# Patient Record
Sex: MALE | Race: WHITE | NOT HISPANIC OR LATINO
[De-identification: names, ages, dates, MRNs, and addresses within clinical notes are randomized per-mention and may not be internally consistent; named-entity substitution may affect disease eponyms.]

---

## 2019-04-08 ENCOUNTER — RESULT CHARGE (OUTPATIENT)
Age: 59
End: 2019-04-08

## 2019-04-09 ENCOUNTER — APPOINTMENT (OUTPATIENT)
Dept: OTHER | Facility: CLINIC | Age: 59
End: 2019-04-09
Payer: COMMERCIAL

## 2019-04-09 VITALS
HEART RATE: 63 BPM | DIASTOLIC BLOOD PRESSURE: 75 MMHG | OXYGEN SATURATION: 98 % | BODY MASS INDEX: 32.78 KG/M2 | WEIGHT: 242 LBS | HEIGHT: 72 IN | RESPIRATION RATE: 14 BRPM | SYSTOLIC BLOOD PRESSURE: 148 MMHG

## 2019-04-09 DIAGNOSIS — Z87.891 PERSONAL HISTORY OF NICOTINE DEPENDENCE: ICD-10-CM

## 2019-04-09 DIAGNOSIS — F17.290 NICOTINE DEPENDENCE, OTHER TOBACCO PRODUCT, UNCOMPLICATED: ICD-10-CM

## 2019-04-09 PROCEDURE — 94060 EVALUATION OF WHEEZING: CPT

## 2019-04-09 PROCEDURE — 99386 PREV VISIT NEW AGE 40-64: CPT | Mod: 25

## 2019-04-09 PROCEDURE — 96150: CPT

## 2019-04-10 LAB
ALBUMIN SERPL ELPH-MCNC: 4.6 G/DL
ALP BLD-CCNC: 66 U/L
ALT SERPL-CCNC: 21 U/L
ANION GAP SERPL CALC-SCNC: 11 MMOL/L
APPEARANCE: CLEAR
AST SERPL-CCNC: 12 U/L
BACTERIA: NEGATIVE
BASOPHILS # BLD AUTO: 0.06 K/UL
BASOPHILS NFR BLD AUTO: 0.7 %
BILIRUB SERPL-MCNC: 0.3 MG/DL
BILIRUBIN URINE: NEGATIVE
BLOOD URINE: NORMAL
BUN SERPL-MCNC: 12 MG/DL
CALCIUM SERPL-MCNC: 9.1 MG/DL
CHLORIDE SERPL-SCNC: 103 MMOL/L
CHOLEST SERPL-MCNC: 209 MG/DL
CHOLEST/HDLC SERPL: 6.7 RATIO
CO2 SERPL-SCNC: 26 MMOL/L
COLOR: NORMAL
CREAT SERPL-MCNC: 0.95 MG/DL
EOSINOPHIL # BLD AUTO: 0.27 K/UL
EOSINOPHIL NFR BLD AUTO: 3.3 %
GLUCOSE QUALITATIVE U: NEGATIVE
GLUCOSE SERPL-MCNC: 93 MG/DL
HCT VFR BLD CALC: 43.9 %
HDLC SERPL-MCNC: 31 MG/DL
HGB BLD-MCNC: 14 G/DL
HYALINE CASTS: 0 /LPF
IMM GRANULOCYTES NFR BLD AUTO: 0.4 %
KETONES URINE: NEGATIVE
LDLC SERPL CALC-MCNC: 131 MG/DL
LEUKOCYTE ESTERASE URINE: NEGATIVE
LYMPHOCYTES # BLD AUTO: 3.08 K/UL
LYMPHOCYTES NFR BLD AUTO: 37.7 %
MAN DIFF?: NORMAL
MCHC RBC-ENTMCNC: 29.1 PG
MCHC RBC-ENTMCNC: 31.9 GM/DL
MCV RBC AUTO: 91.3 FL
MICROSCOPIC-UA: NORMAL
MONOCYTES # BLD AUTO: 0.65 K/UL
MONOCYTES NFR BLD AUTO: 7.9 %
NEUTROPHILS # BLD AUTO: 4.09 K/UL
NEUTROPHILS NFR BLD AUTO: 50 %
NITRITE URINE: NEGATIVE
PH URINE: 6
PLATELET # BLD AUTO: 240 K/UL
POTASSIUM SERPL-SCNC: 4.4 MMOL/L
PROT SERPL-MCNC: 6.8 G/DL
PROTEIN URINE: NEGATIVE
RBC # BLD: 4.81 M/UL
RBC # FLD: 12.8 %
RED BLOOD CELLS URINE: 4 /HPF
SODIUM SERPL-SCNC: 140 MMOL/L
SPECIFIC GRAVITY URINE: 1.02
SQUAMOUS EPITHELIAL CELLS: 1 /HPF
TRIGL SERPL-MCNC: 235 MG/DL
UROBILINOGEN URINE: NORMAL
WBC # FLD AUTO: 8.18 K/UL
WHITE BLOOD CELLS URINE: 0 /HPF

## 2019-04-10 NOTE — DISCUSSION/SUMMARY
[Patient seen for WTC Monitoring ___] : Patient was seen for WTC monitoring [unfilled] [Please See Note in Chart and Documentation in Trial DB] : Please see note in chart and documentation in Trial DB. [FreeTextEntry3] : [facility] Monitoring Evaluation\par \par  ID#: [id number] 	               \par  Visit: 1	                        \par  Date: 4/9/19 \par \par HPI: 58 yr old white male presents to [facility] for 1st annual monitoring visit. He is presently treated for HTN, HLD and high triglycerides. He has a right sided moveable neck mass and had an US which revealed nodules. He never f/u and went to endocrinologist. He c/o chronic cough with wheezing at times. He is a former cigarette smoker and just smokes occasional cigar now. He sometimes gets chest tightness and JIMENEZ+. He has chest burning and acid reflux since January of 2004 and takes omeprazole on occasion. He snores and has witnessed apneas. He wakes up choking. He has chronic sinus congestion since June of 2005 but doesn’t take any meds at this time. He has chronic fatigue and tinnitus. 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 HE was exposed to significant amounts of dust and debris.\par Occupational Hx: [employer] \par PMH:See Allscripts and Trial DB\par PSH: See Allscripts and Trial DB \par Family History: See Allscripts and Trial DB \par Preventive Screening: \par Colonoscopy-never had                           \par CXR-1/1/19\par Prostate exam-never had\par Allergies: See Allscripts and Trial DB \par Meds: See Trial DB and Allscripts \par Social  Hx: See Allscripts and Trial DB\par Smoking Status: See Allscripts and Trial DB \par Review of Systems-IAMQ reviewed with patient\par \par PHYSICAL EXAM: See Trial DB.  \par \par Spirometry: Reviewed. Low VC\par \par Assessment:\par HTN \par HLD\par High triglycerides\par HX wheezing\par Fatigue\par JIMENEZ+\par GERD/chest burning\par Snoring/witnessed apneas\par Overweight\par Tinnitus\par Right sided neck mass-recent thyroid US showed nodules per pt-he never f/u with endocrinologist\par \par Plan: \par CBC, CMP, lipids, UA, spirometry ordered\par Refusing colonoscopy at this time\par Referred to pulmonary for assessment of wheezing and snoring\par Referred to Endocrine for further evaluation of neck mass/thyroid nodules\par F/U with PCP for elevated blood pressure,fatigue, GERD, sinusitis\par Will submit paperwork for certification of sinusitis and GERD\par F/U at [facility] post certification\par \par \par   \par \par \par \par \par \par \par \par \par

## 2019-05-21 ENCOUNTER — APPOINTMENT (OUTPATIENT)
Dept: PULMONOLOGY | Facility: CLINIC | Age: 59
End: 2019-05-21
Payer: COMMERCIAL

## 2019-05-21 VITALS
DIASTOLIC BLOOD PRESSURE: 91 MMHG | SYSTOLIC BLOOD PRESSURE: 134 MMHG | BODY MASS INDEX: 31.42 KG/M2 | HEIGHT: 72 IN | HEART RATE: 78 BPM | OXYGEN SATURATION: 98 % | TEMPERATURE: 98 F | WEIGHT: 232 LBS

## 2019-05-21 PROCEDURE — 94729 DIFFUSING CAPACITY: CPT

## 2019-05-21 PROCEDURE — 99203 OFFICE O/P NEW LOW 30 MIN: CPT | Mod: 25

## 2019-05-21 PROCEDURE — 94726 PLETHYSMOGRAPHY LUNG VOLUMES: CPT

## 2019-05-21 PROCEDURE — 94010 BREATHING CAPACITY TEST: CPT

## 2019-05-21 PROCEDURE — ZZZZZ: CPT

## 2019-05-22 NOTE — REVIEW OF SYSTEMS
[Dyspnea] : dyspnea [Wheezing] : wheezing [Snoring] : snoring [Witnessed Apneas] : demonstrated no ~M apnea [Nonrestorative Sleep] : nonrestorative sleep [Negative] : Endocrine

## 2019-05-22 NOTE — PROCEDURE
[FreeTextEntry1] : Spirometry shows no obstruction. Lung volumes show mild restriction. DLCO is normal.

## 2019-05-22 NOTE — REASON FOR VISIT
[Initial Evaluation] : an initial evaluation [FreeTextEntry1] : [facility] for wheezing and snoring

## 2019-05-22 NOTE — PHYSICAL EXAM
[General Appearance - Well Developed] : well developed [Normal Appearance] : normal appearance [Well Groomed] : well groomed [General Appearance - Well Nourished] : well nourished [No Deformities] : no deformities [General Appearance - In No Acute Distress] : no acute distress [Normal Conjunctiva] : the conjunctiva exhibited no abnormalities [Normal Oropharynx] : normal oropharynx [III] : III [Neck Appearance] : the appearance of the neck was normal [FreeTextEntry1] : Enlarged thyroid [Heart Rate And Rhythm] : heart rate and rhythm were normal [Heart Sounds] : normal S1 and S2 [Murmurs] : no murmurs present [Arterial Pulses Normal] : the arterial pulses were normal [Edema] : no peripheral edema present [Respiration, Rhythm And Depth] : normal respiratory rhythm and effort [Exaggerated Use Of Accessory Muscles For Inspiration] : no accessory muscle use [Auscultation Breath Sounds / Voice Sounds] : lungs were clear to auscultation bilaterally [Bowel Sounds] : normal bowel sounds [Abdomen Soft] : soft [Abdomen Tenderness] : non-tender [] : no hepato-splenomegaly [Abdomen Mass (___ Cm)] : no abdominal mass palpated [Abnormal Walk] : normal gait [Nail Clubbing] : no clubbing of the fingernails [Cyanosis, Localized] : no localized cyanosis [Skin Color & Pigmentation] : normal skin color and pigmentation [Skin Turgor] : normal skin turgor [No Focal Deficits] : no focal deficits [Oriented To Time, Place, And Person] : oriented to person, place, and time [Impaired Insight] : insight and judgment were intact

## 2019-05-28 ENCOUNTER — APPOINTMENT (OUTPATIENT)
Dept: ENDOCRINOLOGY | Facility: CLINIC | Age: 59
End: 2019-05-28
Payer: COMMERCIAL

## 2019-05-28 VITALS
HEIGHT: 72 IN | TEMPERATURE: 98.2 F | BODY MASS INDEX: 31.42 KG/M2 | OXYGEN SATURATION: 98 % | HEART RATE: 65 BPM | DIASTOLIC BLOOD PRESSURE: 78 MMHG | SYSTOLIC BLOOD PRESSURE: 120 MMHG | WEIGHT: 232 LBS

## 2019-05-28 PROCEDURE — 36415 COLL VENOUS BLD VENIPUNCTURE: CPT

## 2019-05-28 PROCEDURE — 99204 OFFICE O/P NEW MOD 45 MIN: CPT | Mod: 25

## 2019-07-07 NOTE — PHYSICAL EXAM
[Alert] : alert [No Acute Distress] : no acute distress [Well Nourished] : well nourished [Well Developed] : well developed [Normal Sclera/Conjunctiva] : normal sclera/conjunctiva [EOMI] : extra ocular movement intact [No Proptosis] : no proptosis [Normal Oropharynx] : the oropharynx was normal [No Thyroid Nodules] : there were no palpable thyroid nodules [No Respiratory Distress] : no respiratory distress [No Accessory Muscle Use] : no accessory muscle use [Clear to Auscultation] : lungs were clear to auscultation bilaterally [Normal Rate] : heart rate was normal  [Normal S1, S2] : normal S1 and S2 [Regular Rhythm] : with a regular rhythm [Pedal Pulses Normal] : the pedal pulses are present [No Edema] : there was no peripheral edema [Normal Bowel Sounds] : normal bowel sounds [Not Tender] : non-tender [Soft] : abdomen soft [Not Distended] : not distended [Post Cervical Nodes] : posterior cervical nodes [Anterior Cervical Nodes] : anterior cervical nodes [Axillary Nodes] : axillary nodes [Normal] : normal and non tender [No Spinal Tenderness] : no spinal tenderness [Spine Straight] : spine straight [No Stigmata of Cushings Syndrome] : no stigmata of cushings syndrome [Normal Gait] : normal gait [Normal Strength/Tone] : muscle strength and tone were normal [No Rash] : no rash [Normal Reflexes] : deep tendon reflexes were 2+ and symmetric [No Tremors] : no tremors [Oriented x3] : oriented to person, place, and time [Acanthosis Nigricans] : no acanthosis nigricans [de-identified] : Rl lower/lower isthmic  region nodule about 2 cm. No palp adenopathy

## 2019-07-07 NOTE — HISTORY OF PRESENT ILLNESS
[FreeTextEntry1] : Mr. EUCEDA  is a 58 year  year old male  who presents for initial endocrine evaluation. He presents with regard to a history of goiter with recent thyroid US showing a 2.4 cm isthmic nodule along with other subcm nodules .\par Additional medical history includes that of htn, hyperlipidemia and gerd. He too notes that he was [place]   [place] a [place] . he too notes hx of hyperlipidemia.\par FH  No known thyroid hx.\par Very tired chronically.\par Trying to lose weight  lost about 10 lbs with 8-16 diet.\par Denies anterior neck discomfort, difficulty swallowing, or any change in the appearance of the neck region.\par

## 2019-07-08 ENCOUNTER — CLINICAL ADVICE (OUTPATIENT)
Age: 59
End: 2019-07-08

## 2019-07-09 LAB
25(OH)D3 SERPL-MCNC: 19.4 NG/ML
BASOPHILS # BLD AUTO: 0.04 K/UL
BASOPHILS NFR BLD AUTO: 0.6 %
COLLAGEN CTX SERPL-MCNC: 419 PG/ML
EOSINOPHIL # BLD AUTO: 0.19 K/UL
EOSINOPHIL NFR BLD AUTO: 3 %
ESTIMATED AVERAGE GLUCOSE: 111 MG/DL
HBA1C MFR BLD HPLC: 5.5 %
HCT VFR BLD CALC: 41.6 %
HGB BLD-MCNC: 13.7 G/DL
IMM GRANULOCYTES NFR BLD AUTO: 0.2 %
LYMPHOCYTES # BLD AUTO: 2.58 K/UL
LYMPHOCYTES NFR BLD AUTO: 40.7 %
MAN DIFF?: NORMAL
MCHC RBC-ENTMCNC: 29.3 PG
MCHC RBC-ENTMCNC: 32.9 GM/DL
MCV RBC AUTO: 89.1 FL
MONOCYTES # BLD AUTO: 0.6 K/UL
MONOCYTES NFR BLD AUTO: 9.5 %
NEUTROPHILS # BLD AUTO: 2.92 K/UL
NEUTROPHILS NFR BLD AUTO: 46 %
PLATELET # BLD AUTO: 232 K/UL
RBC # BLD: 4.67 M/UL
RBC # FLD: 12.7 %
T3FREE SERPL-MCNC: 2.8 PG/ML
T4 FREE SERPL-MCNC: 1.2 NG/DL
THYROGLOB AB SERPL-ACNC: <20 IU/ML
THYROPEROXIDASE AB SERPL IA-ACNC: 409 IU/ML
TSH SERPL-ACNC: 0.94 UIU/ML
VIT B12 SERPL-MCNC: 492 PG/ML
WBC # FLD AUTO: 6.34 K/UL

## 2019-07-10 ENCOUNTER — FORM ENCOUNTER (OUTPATIENT)
Age: 59
End: 2019-07-10

## 2019-07-24 ENCOUNTER — APPOINTMENT (OUTPATIENT)
Dept: ENDOCRINOLOGY | Facility: CLINIC | Age: 59
End: 2019-07-24
Payer: COMMERCIAL

## 2019-07-24 ENCOUNTER — RX RENEWAL (OUTPATIENT)
Age: 59
End: 2019-07-24

## 2019-07-24 VITALS
BODY MASS INDEX: 29.26 KG/M2 | HEART RATE: 66 BPM | HEIGHT: 72 IN | OXYGEN SATURATION: 98 % | TEMPERATURE: 98.4 F | DIASTOLIC BLOOD PRESSURE: 78 MMHG | SYSTOLIC BLOOD PRESSURE: 120 MMHG | WEIGHT: 216 LBS

## 2019-07-24 DIAGNOSIS — E66.3 OVERWEIGHT: ICD-10-CM

## 2019-07-24 DIAGNOSIS — R63.4 ABNORMAL WEIGHT LOSS: ICD-10-CM

## 2019-07-24 DIAGNOSIS — R53.82 CHRONIC FATIGUE, UNSPECIFIED: ICD-10-CM

## 2019-07-24 PROCEDURE — 99214 OFFICE O/P EST MOD 30 MIN: CPT

## 2019-07-24 RX ORDER — LOVASTATIN 20 MG/1
20 TABLET ORAL
Refills: 0 | Status: DISCONTINUED | COMMUNITY
End: 2019-07-24

## 2019-07-24 RX ORDER — ERGOCALCIFEROL 1.25 MG/1
1.25 MG CAPSULE ORAL
Qty: 13 | Refills: 3 | Status: ACTIVE | COMMUNITY
Start: 2019-07-24 | End: 1900-01-01

## 2019-08-14 ENCOUNTER — APPOINTMENT (OUTPATIENT)
Dept: SURGERY | Facility: CLINIC | Age: 59
End: 2019-08-14
Payer: COMMERCIAL

## 2019-08-14 DIAGNOSIS — Z86.39 PERSONAL HISTORY OF OTHER ENDOCRINE, NUTRITIONAL AND METABOLIC DISEASE: ICD-10-CM

## 2019-08-14 DIAGNOSIS — Z86.79 PERSONAL HISTORY OF OTHER DISEASES OF THE CIRCULATORY SYSTEM: ICD-10-CM

## 2019-08-14 PROCEDURE — 99204 OFFICE O/P NEW MOD 45 MIN: CPT

## 2019-08-18 PROBLEM — Z86.39 HISTORY OF HIGH CHOLESTEROL: Status: RESOLVED | Noted: 2019-08-18 | Resolved: 2019-08-18

## 2019-08-18 PROBLEM — Z86.79 HISTORY OF HYPERTENSION: Status: RESOLVED | Noted: 2019-08-18 | Resolved: 2019-08-18

## 2019-08-21 PROBLEM — R53.82 CHRONIC FATIGUE: Status: ACTIVE | Noted: 2019-05-28

## 2019-08-21 PROBLEM — R63.4 WEIGHT LOSS: Status: ACTIVE | Noted: 2019-07-07

## 2019-08-21 PROBLEM — E66.3 OVER WEIGHT: Status: ACTIVE | Noted: 2019-08-21

## 2019-08-21 NOTE — REASON FOR VISIT
[Initial Consultation] : an initial consultation for [Other: _____] : [unfilled] [FreeTextEntry2] : suspicious left thyroid nodule

## 2019-08-21 NOTE — CONSULT LETTER
[Dear  ___] : Dear  [unfilled], [Consult Letter:] : I had the pleasure of evaluating your patient, [unfilled]. [Please see my note below.] : Please see my note below. [Consult Closing:] : Thank you very much for allowing me to participate in the care of this patient.  If you have any questions, please do not hesitate to contact me. [FreeTextEntry2] : Dr. Alberto Gomes [FreeTextEntry3] : Sincerely yours,\par \par Isi Del Rosario MD, FACS\par Assistant Professor of Surgery\par [facility]

## 2019-08-21 NOTE — PHYSICAL EXAM
[Normal] : orientation to person, place, and time: normal [de-identified] : no cervical or supraclavicular adenopathy, trachea midline, thyroid isthmus nodule 2.5 cm , left lobe full without  palpable discreet mass

## 2019-08-21 NOTE — HISTORY OF PRESENT ILLNESS
[FreeTextEntry1] : Mr. EUCEDA  is a 59 year year old male who returns today for endocrine reevaluation.  Patient returns with regard to  a history of nodular thyroid .  He did recently undergo US guided FNA of a 2.1 x 1.8 left interpolar thyroid nodule.  The initial results were c/w atypia of undetermined significance-[place] [facility].  Subsequent Thyroseq testing demonstrated A kras mutation with malig risk said o be at 40%.  this was discussed with patient in great detail today.\par Does follow with [facility] health program through [facility] in [facility]   [phone]   \par Denies anterior neck discomfort, difficulty swallowing, or any change in the appearance of the neck region.\par \par

## 2019-08-21 NOTE — HISTORY OF PRESENT ILLNESS
[de-identified] : Patient referred by Dr. Gomes for evaluation of suspicious left thyroid nodule. In 2003 patient noted neck swelling, diagnosed with thyroid nodules. Denies dysphagia, change in voice. Patient being monitored by [facility]. Thyroid ultrasound April 2017: Right lobe 4.4 x 1.5 x 2.3 CM with subcentimeter nodules. The left lobe 6.3 x 3.1 x 2.3 CM with multiple nodules largest 2.3 CM. Isthmus complex mass 2.4 x 1.4 x 2.1 CM. Biopsies June 2019: Isthmus nodule 2.5 cm benign, left mid nodule 2.1 cm with course calcifications   AUS, KRAS dictation Thyroseq, intermediate risk 40%.

## 2019-08-21 NOTE — PHYSICAL EXAM
[Alert] : alert [No Acute Distress] : no acute distress [Well Developed] : well developed [Well Nourished] : well nourished [EOMI] : extra ocular movement intact [Normal Sclera/Conjunctiva] : normal sclera/conjunctiva [No Proptosis] : no proptosis [Thyroid Not Enlarged] : the thyroid was not enlarged [Normal Oropharynx] : the oropharynx was normal [No Thyroid Nodules] : there were no palpable thyroid nodules [No Respiratory Distress] : no respiratory distress [No Accessory Muscle Use] : no accessory muscle use [Clear to Auscultation] : lungs were clear to auscultation bilaterally [Normal Rate] : heart rate was normal  [Normal S1, S2] : normal S1 and S2 [Pedal Pulses Normal] : the pedal pulses are present [Regular Rhythm] : with a regular rhythm [No Edema] : there was no peripheral edema [Normal Bowel Sounds] : normal bowel sounds [Not Tender] : non-tender [Soft] : abdomen soft [Post Cervical Nodes] : posterior cervical nodes [Not Distended] : not distended [Anterior Cervical Nodes] : anterior cervical nodes [Axillary Nodes] : axillary nodes [Normal] : normal and non tender [No Spinal Tenderness] : no spinal tenderness [Spine Straight] : spine straight [Normal Gait] : normal gait [No Stigmata of Cushings Syndrome] : no stigmata of cushings syndrome [Normal Strength/Tone] : muscle strength and tone were normal [No Rash] : no rash [Acanthosis Nigricans] : no acanthosis nigricans [Normal Reflexes] : deep tendon reflexes were 2+ and symmetric [No Tremors] : no tremors [Oriented x3] : oriented to person, place, and time

## 2019-08-21 NOTE — ASSESSMENT
[FreeTextEntry1] : Patient with suspicious left thyroid nodule and increased risk due to exposure for malignancy. I have recommended a left thyroid lobectomy and isthmusectomy for definitive diagnosis and treatment. The patient is reluctant to undergo surgery. He understands the potential risk of undiagnosed malignancy. I have discussed the risks, benefits and alternative treatments which include but are not limited to bleeding, infection, numbness, hoarseness, hypocalcemia, scarring, and need for reoperation. I have answered the patient's questions.  I have requested a followup ultrasound January 2020, RTO6 months. If nodule enlarges, surgery will be scheduled at that time.

## 2019-08-22 ENCOUNTER — FORM ENCOUNTER (OUTPATIENT)
Age: 59
End: 2019-08-22

## 2019-12-16 ENCOUNTER — EMERGENCY (EMERGENCY)
Facility: HOSPITAL | Age: 59
LOS: 1 days | Discharge: HOME | End: 2019-12-16
Attending: EMERGENCY MEDICINE
Payer: COMMERCIAL

## 2019-12-16 VITALS
RESPIRATION RATE: 18 BRPM | HEART RATE: 78 BPM | OXYGEN SATURATION: 99 % | SYSTOLIC BLOOD PRESSURE: 166 MMHG | WEIGHT: 210.1 LBS | DIASTOLIC BLOOD PRESSURE: 98 MMHG | TEMPERATURE: 98 F

## 2019-12-16 VITALS
DIASTOLIC BLOOD PRESSURE: 90 MMHG | HEART RATE: 60 BPM | OXYGEN SATURATION: 98 % | SYSTOLIC BLOOD PRESSURE: 155 MMHG | RESPIRATION RATE: 19 BRPM

## 2019-12-16 LAB
ALBUMIN SERPL ELPH-MCNC: 4.3 G/DL — SIGNIFICANT CHANGE UP (ref 3.5–5)
ALP SERPL-CCNC: 66 U/L — SIGNIFICANT CHANGE UP (ref 40–120)
ALT FLD-CCNC: 23 U/L DA — SIGNIFICANT CHANGE UP (ref 10–60)
ANION GAP SERPL CALC-SCNC: 3 MMOL/L — LOW (ref 5–17)
APTT BLD: 32.8 SEC — SIGNIFICANT CHANGE UP (ref 27.5–36.3)
AST SERPL-CCNC: 9 U/L — LOW (ref 10–40)
BILIRUB SERPL-MCNC: 0.3 MG/DL — SIGNIFICANT CHANGE UP (ref 0.2–1.2)
BUN SERPL-MCNC: 16 MG/DL — SIGNIFICANT CHANGE UP (ref 7–18)
CALCIUM SERPL-MCNC: 8.9 MG/DL — SIGNIFICANT CHANGE UP (ref 8.4–10.5)
CHLORIDE SERPL-SCNC: 107 MMOL/L — SIGNIFICANT CHANGE UP (ref 96–108)
CO2 SERPL-SCNC: 31 MMOL/L — SIGNIFICANT CHANGE UP (ref 22–31)
CREAT SERPL-MCNC: 0.84 MG/DL — SIGNIFICANT CHANGE UP (ref 0.5–1.3)
GLUCOSE SERPL-MCNC: 92 MG/DL — SIGNIFICANT CHANGE UP (ref 70–99)
HCT VFR BLD CALC: 44.8 % — SIGNIFICANT CHANGE UP (ref 39–50)
HGB BLD-MCNC: 14.8 G/DL — SIGNIFICANT CHANGE UP (ref 13–17)
INR BLD: 0.98 RATIO — SIGNIFICANT CHANGE UP (ref 0.88–1.16)
MCHC RBC-ENTMCNC: 29 PG — SIGNIFICANT CHANGE UP (ref 27–34)
MCHC RBC-ENTMCNC: 33 GM/DL — SIGNIFICANT CHANGE UP (ref 32–36)
MCV RBC AUTO: 87.8 FL — SIGNIFICANT CHANGE UP (ref 80–100)
NRBC # BLD: 0 /100 WBCS — SIGNIFICANT CHANGE UP (ref 0–0)
NT-PROBNP SERPL-SCNC: 27 PG/ML — SIGNIFICANT CHANGE UP (ref 0–125)
PLATELET # BLD AUTO: 226 K/UL — SIGNIFICANT CHANGE UP (ref 150–400)
POTASSIUM SERPL-MCNC: 4.2 MMOL/L — SIGNIFICANT CHANGE UP (ref 3.5–5.3)
POTASSIUM SERPL-SCNC: 4.2 MMOL/L — SIGNIFICANT CHANGE UP (ref 3.5–5.3)
PROT SERPL-MCNC: 7.6 G/DL — SIGNIFICANT CHANGE UP (ref 6–8.3)
PROTHROM AB SERPL-ACNC: 10.9 SEC — SIGNIFICANT CHANGE UP (ref 10–12.9)
RBC # BLD: 5.1 M/UL — SIGNIFICANT CHANGE UP (ref 4.2–5.8)
RBC # FLD: 12.9 % — SIGNIFICANT CHANGE UP (ref 10.3–14.5)
SODIUM SERPL-SCNC: 141 MMOL/L — SIGNIFICANT CHANGE UP (ref 135–145)
TROPONIN I SERPL-MCNC: <0.015 NG/ML — SIGNIFICANT CHANGE UP (ref 0–0.04)
WBC # BLD: 10.26 K/UL — SIGNIFICANT CHANGE UP (ref 3.8–10.5)
WBC # FLD AUTO: 10.26 K/UL — SIGNIFICANT CHANGE UP (ref 3.8–10.5)

## 2019-12-16 PROCEDURE — 36415 COLL VENOUS BLD VENIPUNCTURE: CPT

## 2019-12-16 PROCEDURE — 85610 PROTHROMBIN TIME: CPT

## 2019-12-16 PROCEDURE — 71045 X-RAY EXAM CHEST 1 VIEW: CPT | Mod: 26

## 2019-12-16 PROCEDURE — 84484 ASSAY OF TROPONIN QUANT: CPT

## 2019-12-16 PROCEDURE — 80053 COMPREHEN METABOLIC PANEL: CPT

## 2019-12-16 PROCEDURE — 85027 COMPLETE CBC AUTOMATED: CPT

## 2019-12-16 PROCEDURE — 85730 THROMBOPLASTIN TIME PARTIAL: CPT

## 2019-12-16 PROCEDURE — 93005 ELECTROCARDIOGRAM TRACING: CPT

## 2019-12-16 PROCEDURE — 99284 EMERGENCY DEPT VISIT MOD MDM: CPT | Mod: 25

## 2019-12-16 PROCEDURE — 71045 X-RAY EXAM CHEST 1 VIEW: CPT

## 2019-12-16 PROCEDURE — 99285 EMERGENCY DEPT VISIT HI MDM: CPT

## 2019-12-16 PROCEDURE — 83880 ASSAY OF NATRIURETIC PEPTIDE: CPT

## 2019-12-16 RX ORDER — AMLODIPINE BESYLATE 2.5 MG/1
10 TABLET ORAL ONCE
Refills: 0 | Status: COMPLETED | OUTPATIENT
Start: 2019-12-16 | End: 2019-12-16

## 2019-12-16 RX ORDER — AMLODIPINE BESYLATE 2.5 MG/1
1 TABLET ORAL
Qty: 30 | Refills: 0
Start: 2019-12-16 | End: 2020-01-14

## 2019-12-16 RX ADMIN — AMLODIPINE BESYLATE 10 MILLIGRAM(S): 2.5 TABLET ORAL at 05:38

## 2019-12-16 NOTE — ED ADULT NURSE NOTE - OBJECTIVE STATEMENT
pt aaox4 with complaint of dizziness since yesterday, as per pt stopped taking blood pressure medications x 4 months,,denies chest,denies shortness of breath

## 2019-12-16 NOTE — ED PROVIDER NOTE - PATIENT PORTAL LINK FT
You can access the FollowMyHealth Patient Portal offered by [facility] by registering at the following website: [email or web address]. By joining [email or web address]’s FollowMyHealth portal, you will also be able to view your health information using other applications (apps) compatible with our system.

## 2019-12-16 NOTE — ED PROVIDER NOTE - CLINICAL SUMMARY MEDICAL DECISION MAKING FREE TEXT BOX
60yo M with hx HTN/HLD noncompliant with medications presents with dizziness.   EKG nonischemic, trop neg, Cr wnl  patient declines CT head, given amlodipine 10mg, will reeval BP.

## 2019-12-16 NOTE — ED PROVIDER NOTE - NORMAL, MLM
Onset: just now    Location / description: Awoke this am with dizziness and nausea.  +room spinning. States has had vertigo in the past and this feels similar.  No vomiting.  Also took first dose of Gabapentin last night and is unsure if that is what's causing the sx.  Able to walk without difficulty.  Advised to FU within 24 hours. Verb understanding care advice and to call back with further questions/concerns.      Pain Scale (1 - 10), 10 highest: 1/10 HA    What improves/worsens symptoms: nothing    Symptom specific medications: Gabapentin    LMP : No LMP recorded. Patient is not currently having periods (Reason: Intrauterine Device).  Are you pregnant or breast feeding: na    Recent Care: 1/29/18      Reason for Disposition  • [1] MODERATE dizziness (e.g., feels very unsteady, interferes with normal activities) AND [2] has NOT been evaluated by physician for this    Protocols used: DIZZINESS - VERTIGO-A-AH       singh all pertinent systems normal

## 2019-12-16 NOTE — ED PROVIDER NOTE - OBJECTIVE STATEMENT
60yo M with hx HTN/HLD noncompliant with medications presents with dizziness. Reports that for the past week she has been having intermittent lightheadedness. Reports that earlier today /100 he became concerned and decided to come to ED. Reports he took losartan 40mg at 12am. Reports mild headache currently. Also reports intemittent chest pain which he attributes to anxiety. Reports he stopped taking his meds for HTN 6 months ago after he lost weight and noted his BP was better controlled. Denies visual changes or focal weakness or numbness.

## 2020-01-29 ENCOUNTER — FORM ENCOUNTER (OUTPATIENT)
Age: 60
End: 2020-01-29

## 2020-02-12 ENCOUNTER — APPOINTMENT (OUTPATIENT)
Dept: SURGERY | Facility: CLINIC | Age: 60
End: 2020-02-12

## 2020-04-20 ENCOUNTER — APPOINTMENT (OUTPATIENT)
Dept: OTHER | Facility: CLINIC | Age: 60
End: 2020-04-20
Payer: COMMERCIAL

## 2020-04-20 PROCEDURE — 99396 PREV VISIT EST AGE 40-64: CPT

## 2020-04-20 NOTE — DISCUSSION/SUMMARY
[Patient seen for WTC Monitoring ___] : Patient was seen for WTC monitoring [unfilled] [Please See Note in Chart and Documentation in Trial DB] : Please see note in chart and documentation in Trial DB. [FreeTextEntry3] : HPI: 59 yr old white male presents to [facility] for annual monitoring visit. \par this encounter was conducted over  on the telephone during time of COVID 19 pandemic\par \par  In 2003 patient noted neck swelling, diagnosed with thyroid nodules. Denies dysphagia, change in voice. Patient being monitored by [employer]. Thyroid ultrasound April 2017: Right lobe 4.4 x 1.5 x 2.3 CM with subcentimeter nodules. The left lobe 6.3 x 3.1 x 2.3 CM with multiple nodules largest 2.3 CM. Isthmus complex mass 2.4 x 1.4 x 2.1 CM. Biopsies June 2019: Isthmus nodule 2.5 cm benign, left mid nodule 2.1 cm with course calcifications AUS, KRAS dictation Thyroseq, intermediate risk 40%. \par  \par Patient with suspicious left thyroid nodule and increased risk due to exposure for malignancy. HE WAS recommended TO HAVE a left thyroid lobectomy and isthmusectomy for definitive diagnosis and treatment. The patient WAS reluctant to undergo surgery.  followup ultrasound January 2020 WAS RECOMMENDED BUT PATIENT DID NOT FOLLOW UP. If nodule enlarges, surgery will be scheduled at that time. 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 HE was exposed to significant amounts of dust and debris.\par Occupational Hx: [employer] \par PMH:HTN, HLD and high triglycerides.\par PSH: See Allscripts and Trial DB \par Family History: See Allscripts and Trial DB \par Preventive Screening: \par Colonoscopy- never had \par CXR-1/1/19\par \par Allergies:  nkda \par Meds: REVIEWED IN   Trial DB and Allscripts \par Social Hx: See Allscripts and Trial DB\par Smoking Status: former smoker \par Review of Systems-IAMQ reviewed with patient\par \par PHYSICAL EXAM: to be performed at a later date during face to face encounter to complete Health system monitoring visit\par \par \par Spirometry: to be performed at a later date during face to face encounter to complete Health system monitoring visit\par \par \par Assessment and plan  :\par \par CBC, CMP, lipids, UA, to be performed at a later date during face to face encounter to complete WTC monitoring visit\par \par referral to US thyroid and head and neck surgery for eval of enlarging thyroid nodule \par referral to screening colonoscopy

## 2020-05-08 ENCOUNTER — TRANSCRIPTION ENCOUNTER (OUTPATIENT)
Age: 60
End: 2020-05-08

## 2020-12-03 ENCOUNTER — APPOINTMENT (OUTPATIENT)
Dept: OTHER | Facility: CLINIC | Age: 60
End: 2020-12-03
Payer: COMMERCIAL

## 2020-12-03 VITALS
HEART RATE: 67 BPM | OXYGEN SATURATION: 98 % | DIASTOLIC BLOOD PRESSURE: 89 MMHG | RESPIRATION RATE: 16 BRPM | SYSTOLIC BLOOD PRESSURE: 146 MMHG | WEIGHT: 215 LBS | TEMPERATURE: 97.5 F | HEIGHT: 72 IN | BODY MASS INDEX: 29.12 KG/M2

## 2020-12-03 DIAGNOSIS — Z23 ENCOUNTER FOR IMMUNIZATION: ICD-10-CM

## 2020-12-03 PROCEDURE — 99214 OFFICE O/P EST MOD 30 MIN: CPT

## 2020-12-03 RX ORDER — OMEPRAZOLE 20 MG/1
TABLET, DELAYED RELEASE ORAL
Refills: 0 | Status: DISCONTINUED | COMMUNITY
End: 2020-12-03

## 2020-12-03 NOTE — ASSESSMENT
[FreeTextEntry1] : \par again i had recommended referral to US thyroid and head and neck surgery for eval of suspicious  enlarging thyroid nodule \par  patient agreed \par \par i recommended screening colonoscopy referral for colon cancer screening  , he declined \par  scalp  lesions - refer to Derm to r/o non melanoma skin cancer \par patient was offered but  declined influenza vaccination\par GERD- cont with Nexium\par diet and weight loss recommended

## 2020-12-03 NOTE — PHYSICAL EXAM

## 2020-12-03 NOTE — HISTORY OF PRESENT ILLNESS
[FreeTextEntry1] : HPI: 60  yr old white male presents to [facility] for follow up\par \par  In 2003 patient noted neck swelling, he was reportedly diagnosed with thyroid nodules. Denies dysphagia, change in voice.\par  Thyroid ultrasound April 2017: Right lobe 4.4 x 1.5 x 2.3 CM with subcentimeter nodules. The left lobe 6.3 x 3.1 x 2.3 CM with multiple nodules largest 2.3 CM. Isthmus complex mass 2.4 x 1.4 x 2.1 CM. \par \par Biopsies June 2019: Isthmus nodule 2.5 cm benign, left mid nodule 2.1 cm with course calcifications AUS, KRAS dictation Thyroseq, intermediate risk 40%. \par  \par Patient with suspicious left thyroid nodule and increased risk due to exposure for malignancy. according to head and neck surgeon note,  HE WAS recommended TO HAVE a left thyroid lobectomy and isthmusectomy for definitive diagnosis and treatment. The patient WAS reluctant to undergo surgery. followup ultrasound January 2020 WAS RECOMMENDED by head and neck surgeon BUT PATIENT DID NOT FOLLOW UP. \par i had referred him for thyroid SONO and follow up with head and neck surgeon 04 2020\par but he stated that when he went for sonogram, there was no referral on file.\par [facility] / there is [facility] on file/ \par \par he has  acid reflux\par  never had EGD\par  no dysphagia \par  no abd pain \par  no blood in stool \par  no weight loss \par does not want to do EGD \par taking Nexium- finds it helpful \par i referred him for colonoscopy but he declined to go \par patient was offered but  declined influenza vaccination\par \par \par c/o scalp lesions- 2 non healing scalp lesions \par \par PCP:Dr Padilla ([place]) \par \par [facility] [place] Exposure Hx: 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 HE was exposed to significant amounts of dust and debris.\par Occupational Hx: [employer] \par

## 2020-12-04 LAB
BASOPHILS # BLD AUTO: 0.05 K/UL
BASOPHILS NFR BLD AUTO: 0.7 %
EOSINOPHIL # BLD AUTO: 0.22 K/UL
EOSINOPHIL NFR BLD AUTO: 3.3 %
HCT VFR BLD CALC: 43.7 %
HGB BLD-MCNC: 14.2 G/DL
IMM GRANULOCYTES NFR BLD AUTO: 0.3 %
LYMPHOCYTES # BLD AUTO: 2.69 K/UL
LYMPHOCYTES NFR BLD AUTO: 39.9 %
MAN DIFF?: NORMAL
MCHC RBC-ENTMCNC: 28.9 PG
MCHC RBC-ENTMCNC: 32.5 GM/DL
MCV RBC AUTO: 89 FL
MONOCYTES # BLD AUTO: 0.62 K/UL
MONOCYTES NFR BLD AUTO: 9.2 %
NEUTROPHILS # BLD AUTO: 3.14 K/UL
NEUTROPHILS NFR BLD AUTO: 46.6 %
PLATELET # BLD AUTO: 239 K/UL
RBC # BLD: 4.91 M/UL
RBC # FLD: 13.1 %
WBC # FLD AUTO: 6.74 K/UL

## 2020-12-07 LAB
ALBUMIN SERPL ELPH-MCNC: 4.7 G/DL
ALP BLD-CCNC: 75 U/L
ALT SERPL-CCNC: 15 U/L
ANION GAP SERPL CALC-SCNC: 11 MMOL/L
APPEARANCE: CLEAR
AST SERPL-CCNC: 13 U/L
BACTERIA: NEGATIVE
BILIRUB SERPL-MCNC: 0.4 MG/DL
BILIRUBIN URINE: NEGATIVE
BLOOD URINE: NORMAL
BUN SERPL-MCNC: 12 MG/DL
CALCIUM SERPL-MCNC: 9.3 MG/DL
CHLORIDE SERPL-SCNC: 104 MMOL/L
CHOLEST SERPL-MCNC: 161 MG/DL
CO2 SERPL-SCNC: 25 MMOL/L
COLOR: YELLOW
CREAT SERPL-MCNC: 0.88 MG/DL
GLUCOSE QUALITATIVE U: NEGATIVE
GLUCOSE SERPL-MCNC: 90 MG/DL
HDLC SERPL-MCNC: 39 MG/DL
HYALINE CASTS: 1 /LPF
KETONES URINE: NEGATIVE
LDLC SERPL CALC-MCNC: 86 MG/DL
LEUKOCYTE ESTERASE URINE: NEGATIVE
MICROSCOPIC-UA: NORMAL
NITRITE URINE: NEGATIVE
NONHDLC SERPL-MCNC: 122 MG/DL
PH URINE: 6
POTASSIUM SERPL-SCNC: 4 MMOL/L
PROT SERPL-MCNC: 6.7 G/DL
PROTEIN URINE: NORMAL
RED BLOOD CELLS URINE: 1 /HPF
SODIUM SERPL-SCNC: 140 MMOL/L
SPECIFIC GRAVITY URINE: 1.02
SQUAMOUS EPITHELIAL CELLS: 1 /HPF
T3FREE SERPL-MCNC: 3.01 PG/ML
T4 FREE SERPL-MCNC: 1.1 NG/DL
TRIGL SERPL-MCNC: 181 MG/DL
TSH SERPL-ACNC: 1.28 UIU/ML
UROBILINOGEN URINE: NORMAL
WHITE BLOOD CELLS URINE: 0 /HPF

## 2020-12-15 ENCOUNTER — APPOINTMENT (OUTPATIENT)
Dept: SURGERY | Facility: CLINIC | Age: 60
End: 2020-12-15
Payer: COMMERCIAL

## 2020-12-15 PROCEDURE — 99213 OFFICE O/P EST LOW 20 MIN: CPT

## 2020-12-15 NOTE — PHYSICAL EXAM
[de-identified] : no cervical or supraclavicular adenopathy, trachea midline, thyroid isthmus nodule 2.5 cm , left lobe full without  palpable discreet mass [Normal] : no neck adenopathy [de-identified] : Skin:  normal appearance.  no rash, nodules, vesicles, or erythema,\par Musculoskeletal:  full range of motion and no deformities appreciated\par Neurological:  grossly intact\par Psychiatric:  oriented to person, place and time with appropriate affect

## 2020-12-15 NOTE — HISTORY OF PRESENT ILLNESS
[de-identified] : Patient referred by Dr. Gomes for evaluation of suspicious left thyroid nodule. In 2003 patient noted neck swelling, diagnosed with thyroid nodules. Denies dysphagia, change in voice. Patient being monitored by [employer] Thyroid ultrasound April 2017: Right lobe 4.4 x 1.5 x 2.3 CM with subcentimeter nodules. The left lobe 6.3 x 3.1 x 2.3 CM with multiple nodules largest 2.3 CM. Isthmus complex mass 2.4 x 1.4 x 2.1 CM. Biopsies June 2019: Isthmus nodule 2.5 cm benign, left mid nodule 2.1 cm with course calcifications   AUS, KRAS dictation Thyroseq, intermediate risk 40%.  Patient did not want surgery.\par F/u US 12/2020 with slight increase in left lower nodule, prior biopsy benign. all other nodules stable.  denies dysphagia, or palpitations. TFTs normal, reports fatigue.

## 2020-12-15 NOTE — ASSESSMENT
[FreeTextEntry1] : Patient with suspicious left thyroid nodule and increased risk due to exposure for malignancy. I have recommended a left thyroid lobectomy and isthmusectomy for definitive diagnosis and treatment. The patient is reluctant to undergo surgery. He understands the potential risk of undiagnosed malignancy. I have discussed the risks, benefits and alternative treatments which include but are not limited to bleeding, infection, numbness, hoarseness, hypocalcemia, scarring, and need for reoperation. I have answered the patient's questions. He would like to continue observation. Will obtain US 6//2021, RTO 6 mo if nodule continues to enlarge, repeat FNA left lower nodule at that time

## 2020-12-21 ENCOUNTER — NON-APPOINTMENT (OUTPATIENT)
Age: 60
End: 2020-12-21

## 2020-12-22 ENCOUNTER — APPOINTMENT (OUTPATIENT)
Dept: DERMATOLOGY | Facility: CLINIC | Age: 60
End: 2020-12-22
Payer: COMMERCIAL

## 2020-12-22 VITALS — BODY MASS INDEX: 30.48 KG/M2 | WEIGHT: 225 LBS | HEIGHT: 72 IN

## 2020-12-22 DIAGNOSIS — L57.0 ACTINIC KERATOSIS: ICD-10-CM

## 2020-12-22 DIAGNOSIS — Z86.018 PERSONAL HISTORY OF OTHER BENIGN NEOPLASM: ICD-10-CM

## 2020-12-22 PROCEDURE — 99202 OFFICE O/P NEW SF 15 MIN: CPT | Mod: GC

## 2021-02-13 ENCOUNTER — EMERGENCY (EMERGENCY)
Facility: HOSPITAL | Age: 61
LOS: 1 days | Discharge: HOME | End: 2021-02-13
Attending: EMERGENCY MEDICINE | Admitting: EMERGENCY MEDICINE
Payer: COMMERCIAL

## 2021-02-13 VITALS
DIASTOLIC BLOOD PRESSURE: 82 MMHG | TEMPERATURE: 98 F | OXYGEN SATURATION: 100 % | RESPIRATION RATE: 16 BRPM | HEART RATE: 60 BPM | SYSTOLIC BLOOD PRESSURE: 138 MMHG

## 2021-02-13 VITALS
OXYGEN SATURATION: 96 % | SYSTOLIC BLOOD PRESSURE: 147 MMHG | DIASTOLIC BLOOD PRESSURE: 82 MMHG | HEART RATE: 75 BPM | TEMPERATURE: 98 F | RESPIRATION RATE: 15 BRPM

## 2021-02-13 LAB
ALBUMIN SERPL ELPH-MCNC: 4.7 G/DL — SIGNIFICANT CHANGE UP (ref 3.3–5)
ALP SERPL-CCNC: 76 U/L — SIGNIFICANT CHANGE UP (ref 40–120)
ALT FLD-CCNC: 13 U/L — SIGNIFICANT CHANGE UP (ref 4–41)
ANION GAP SERPL CALC-SCNC: 12 MMOL/L — SIGNIFICANT CHANGE UP (ref 7–14)
AST SERPL-CCNC: 12 U/L — SIGNIFICANT CHANGE UP (ref 4–40)
BASOPHILS # BLD AUTO: 0.04 K/UL — SIGNIFICANT CHANGE UP (ref 0–0.2)
BASOPHILS NFR BLD AUTO: 0.6 % — SIGNIFICANT CHANGE UP (ref 0–2)
BILIRUB SERPL-MCNC: 0.3 MG/DL — SIGNIFICANT CHANGE UP (ref 0.2–1.2)
BLD GP AB SCN SERPL QL: NEGATIVE — SIGNIFICANT CHANGE UP
BUN SERPL-MCNC: 10 MG/DL — SIGNIFICANT CHANGE UP (ref 7–23)
CALCIUM SERPL-MCNC: 9.2 MG/DL — SIGNIFICANT CHANGE UP (ref 8.4–10.5)
CHLORIDE SERPL-SCNC: 105 MMOL/L — SIGNIFICANT CHANGE UP (ref 98–107)
CO2 SERPL-SCNC: 25 MMOL/L — SIGNIFICANT CHANGE UP (ref 22–31)
CREAT SERPL-MCNC: 0.88 MG/DL — SIGNIFICANT CHANGE UP (ref 0.5–1.3)
EOSINOPHIL # BLD AUTO: 0.1 K/UL — SIGNIFICANT CHANGE UP (ref 0–0.5)
EOSINOPHIL NFR BLD AUTO: 1.5 % — SIGNIFICANT CHANGE UP (ref 0–6)
GLUCOSE SERPL-MCNC: 82 MG/DL — SIGNIFICANT CHANGE UP (ref 70–99)
HCT VFR BLD CALC: 43.8 % — SIGNIFICANT CHANGE UP (ref 39–50)
HGB BLD-MCNC: 13.9 G/DL — SIGNIFICANT CHANGE UP (ref 13–17)
IANC: 3.94 K/UL — SIGNIFICANT CHANGE UP (ref 1.5–8.5)
IMM GRANULOCYTES NFR BLD AUTO: 0.1 % — SIGNIFICANT CHANGE UP (ref 0–1.5)
LYMPHOCYTES # BLD AUTO: 2.15 K/UL — SIGNIFICANT CHANGE UP (ref 1–3.3)
LYMPHOCYTES # BLD AUTO: 31.8 % — SIGNIFICANT CHANGE UP (ref 13–44)
MCHC RBC-ENTMCNC: 28 PG — SIGNIFICANT CHANGE UP (ref 27–34)
MCHC RBC-ENTMCNC: 31.7 GM/DL — LOW (ref 32–36)
MCV RBC AUTO: 88.1 FL — SIGNIFICANT CHANGE UP (ref 80–100)
MONOCYTES # BLD AUTO: 0.52 K/UL — SIGNIFICANT CHANGE UP (ref 0–0.9)
MONOCYTES NFR BLD AUTO: 7.7 % — SIGNIFICANT CHANGE UP (ref 2–14)
NEUTROPHILS # BLD AUTO: 3.94 K/UL — SIGNIFICANT CHANGE UP (ref 1.8–7.4)
NEUTROPHILS NFR BLD AUTO: 58.3 % — SIGNIFICANT CHANGE UP (ref 43–77)
NRBC # BLD: 0 /100 WBCS — SIGNIFICANT CHANGE UP
NRBC # FLD: 0 K/UL — SIGNIFICANT CHANGE UP
OB PNL STL: NEGATIVE — SIGNIFICANT CHANGE UP
PLATELET # BLD AUTO: 237 K/UL — SIGNIFICANT CHANGE UP (ref 150–400)
POTASSIUM SERPL-MCNC: 3.7 MMOL/L — SIGNIFICANT CHANGE UP (ref 3.5–5.3)
POTASSIUM SERPL-SCNC: 3.7 MMOL/L — SIGNIFICANT CHANGE UP (ref 3.5–5.3)
PROT SERPL-MCNC: 7.3 G/DL — SIGNIFICANT CHANGE UP (ref 6–8.3)
RBC # BLD: 4.97 M/UL — SIGNIFICANT CHANGE UP (ref 4.2–5.8)
RBC # FLD: 12.6 % — SIGNIFICANT CHANGE UP (ref 10.3–14.5)
RH IG SCN BLD-IMP: POSITIVE — SIGNIFICANT CHANGE UP
SODIUM SERPL-SCNC: 142 MMOL/L — SIGNIFICANT CHANGE UP (ref 135–145)
WBC # BLD: 6.76 K/UL — SIGNIFICANT CHANGE UP (ref 3.8–10.5)
WBC # FLD AUTO: 6.76 K/UL — SIGNIFICANT CHANGE UP (ref 3.8–10.5)

## 2021-02-13 PROCEDURE — 99284 EMERGENCY DEPT VISIT MOD MDM: CPT

## 2021-02-13 RX ORDER — ONDANSETRON 8 MG/1
4 TABLET, FILM COATED ORAL ONCE
Refills: 0 | Status: COMPLETED | OUTPATIENT
Start: 2021-02-13 | End: 2021-02-13

## 2021-02-13 RX ORDER — PANTOPRAZOLE SODIUM 20 MG/1
80 TABLET, DELAYED RELEASE ORAL ONCE
Refills: 0 | Status: COMPLETED | OUTPATIENT
Start: 2021-02-13 | End: 2021-02-13

## 2021-02-13 RX ORDER — SODIUM CHLORIDE 9 MG/ML
1000 INJECTION INTRAMUSCULAR; INTRAVENOUS; SUBCUTANEOUS ONCE
Refills: 0 | Status: COMPLETED | OUTPATIENT
Start: 2021-02-13 | End: 2021-02-13

## 2021-02-13 RX ORDER — FAMOTIDINE 10 MG/ML
20 INJECTION INTRAVENOUS ONCE
Refills: 0 | Status: COMPLETED | OUTPATIENT
Start: 2021-02-13 | End: 2021-02-13

## 2021-02-13 RX ADMIN — SODIUM CHLORIDE 1000 MILLILITER(S): 9 INJECTION INTRAMUSCULAR; INTRAVENOUS; SUBCUTANEOUS at 22:29

## 2021-02-13 RX ADMIN — PANTOPRAZOLE SODIUM 80 MILLIGRAM(S): 20 TABLET, DELAYED RELEASE ORAL at 20:27

## 2021-02-13 RX ADMIN — FAMOTIDINE 20 MILLIGRAM(S): 10 INJECTION INTRAVENOUS at 20:27

## 2021-02-13 RX ADMIN — ONDANSETRON 4 MILLIGRAM(S): 8 TABLET, FILM COATED ORAL at 20:27

## 2021-02-13 NOTE — ED PROVIDER NOTE - NSFOLLOWUPINSTRUCTIONS_ED_ALL_ED_FT
No signs of emergency medical condition on today's workup.  Presumptive diagnosis made, but further evaluation may be required by your primary care doctor or specialist for a definitive diagnosis.  Therefore, follow up as directed and if symptoms change/worsen or any emergency conditions, please return to the ER.     Please see a gastroenterologist as discussed this week. You will receive a call in the next 24 hours to schedule an appointment. If you do not please call the provided phone number    Take pepcid/maalox as needed as directed for pain. Eat a bland diet    Return for any new/worsening symptoms or any other concern to you

## 2021-02-13 NOTE — ED PROVIDER NOTE - PATIENT PORTAL LINK FT
You can access the FollowMyHealth Patient Portal offered by [facility] by registering at the following website: [email or web address]. By joining [employer]’s FollowMyHealth portal, you will also be able to view your health information using other applications (apps) compatible with our system.

## 2021-02-13 NOTE — ED PROVIDER NOTE - CLINICAL SUMMARY MEDICAL DECISION MAKING FREE TEXT BOX
abd pain no ttp w/ concern of melanotic stools by hx but not on exam. HDS well appearing w/ benign exam. will get labs to r/o anemia and give meds for poss UGIB and reassess if inpt scope necessitated

## 2021-02-13 NOTE — ED PROVIDER NOTE - PHYSICAL EXAMINATION
Gen: Well appearing, NAD  Head: NCAT  HEENT: PERRL, MMM, normal conjunctiva, anicteric, neck supple  Lung: CTAB, no adventitious sounds  CV: RRR, no murmurs  Abd: soft, NTND, no rebound or guarding, no CVAT  MSK: No edema, no visible deformities  Neuro: Moving all extremity grossly  Skin: Warm and dry, no evidence of rash  Psych: normal mood and affect   KENDRICK: gross brown stool no obvious blood or melena

## 2021-02-13 NOTE — ED PROVIDER NOTE - NS ED ROS FT
CONSTITUTIONAL: No fevers, no chills, no dizziness  EYES: no visual changes, no eye pain  EARS: no ear drainage, no ear pain, no change in hearing  NOSE: no nasal congestion  MOUTH/THROAT: no sore throat  CV: No chest pain, no palpitations  RESP: No SOB, no cough  GI: see hpi  : no dysuria, no hematuria, no flank pain  MSK: no back pain, no extremity pain  SKIN: no rashes  NEURO: no headache, no focal weakness, no decreased sensation/paresthesias

## 2021-02-13 NOTE — ED PROVIDER NOTE - OBJECTIVE STATEMENT
59yo M hx htn hld not on AC p/w several days of cramping generalized abd pain w/ food, lightheadedness, and black tarry diarrhea. Thinks he may have had gastritis or peptic ulcer in the past. No fever, chills, cp, sob, abd pain currently, urinary sx. Does not use NSAIDs frequently. 59yo M hx htn hld not on AC p/w several days of cramping generalized abd pain w/ food, lightheadedness, and black tarry diarrhea. Thinks he may have had gastritis or peptic ulcer in the past. No fever, chills, cp, sob, abd pain currently, urinary sx. Does not use NSAIDs frequently.    Attendinyo male presents with abdominal pain nausea and black stools.  had diarrhea and pain is usually relieved after bowel movement.

## 2021-02-13 NOTE — ED ADULT NURSE REASSESSMENT NOTE - NS ED NURSE REASSESS COMMENT FT1
Received report from [facility] pt aox3 in no apparent distress VSS well appearing, medicated as per order for discomfort awaiting lab results will continue to monitor

## 2021-02-13 NOTE — ED ADULT TRIAGE NOTE - CHIEF COMPLAINT QUOTE
pt c/o black tarry stool x 2 days. endorses nausea, dizziness, and abdominal cramps. no blood thinner use, hx of anemia or blood transfusion

## 2021-02-13 NOTE — ED ADULT NURSE NOTE - OBJECTIVE STATEMENT
pt c/o abd pain with dizziness dark tarry stool, iv placed in left ac placed on cm/ pt evaluated by resident

## 2021-02-13 NOTE — ED PROVIDER NOTE - PROGRESS NOTE DETAILS
Trevor Henriquez DO PGY3 - w/u unremarkable no s/s of an active GIB. Pt comfortable w/ plan of outpt workup

## 2021-02-14 LAB — SARS-COV-2 RNA SPEC QL NAA+PROBE: SIGNIFICANT CHANGE UP

## 2021-04-19 ENCOUNTER — APPOINTMENT (OUTPATIENT)
Dept: OTHER | Facility: CLINIC | Age: 61
End: 2021-04-19

## 2021-06-10 ENCOUNTER — APPOINTMENT (OUTPATIENT)
Dept: SURGERY | Facility: CLINIC | Age: 61
End: 2021-06-10

## 2021-06-15 ENCOUNTER — NON-APPOINTMENT (OUTPATIENT)
Age: 61
End: 2021-06-15

## 2021-06-22 ENCOUNTER — APPOINTMENT (OUTPATIENT)
Dept: DERMATOLOGY | Facility: CLINIC | Age: 61
End: 2021-06-22

## 2021-08-25 ENCOUNTER — APPOINTMENT (OUTPATIENT)
Dept: OTHER | Facility: CLINIC | Age: 61
End: 2021-08-25
Payer: COMMERCIAL

## 2021-08-25 PROCEDURE — 99396 PREV VISIT EST AGE 40-64: CPT | Mod: 95

## 2021-08-25 PROCEDURE — 99443: CPT | Mod: 95

## 2021-08-25 NOTE — PAST MEDICAL HISTORY
[FreeTextEntry1] : patient is a  that was exposed to [facility] dust and debris after 09 11 2001\par \par

## 2021-08-25 NOTE — HISTORY OF PRESENT ILLNESS
[Home] : at home, [unfilled] , at the time of the visit. [Other Location: e.g. Home (Enter Location, City,State)___] : at [unfilled] [Verbal consent obtained from patient] : the patient, [unfilled] [FreeTextEntry1] : HPI: 61  yr old white male presents to [facility] for follow up\par \par  In 2003 patient noted neck swelling, he was reportedly diagnosed with thyroid nodules. Denies dysphagia, change in voice.\par  Thyroid ultrasound April 2017: Right lobe 4.4 x 1.5 x 2.3 CM with subcentimeter nodules. The left lobe 6.3 x 3.1 x 2.3 CM with multiple nodules largest 2.3 CM. Isthmus complex mass 2.4 x 1.4 x 2.1 CM. \par \par Biopsies June 2019: Isthmus nodule 2.5 cm benign, left mid nodule 2.1 cm with course calcifications AUS, KRAS dictation Thyroseq, intermediate risk 40%. \par he was evaluated by head and neck surgeon \par the plan was \par   US 6//2021, RTO 6 mo if nodule continues to enlarge, repeat FNA left lower nodule at that time. 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 HE was exposed to significant amounts of dust and debris.\par Occupational Hx: [employer] \par

## 2021-08-25 NOTE — ASSESSMENT
[FreeTextEntry1] : \par i had recommended referral to US thyroid and head and neck surgery for eval of suspicious  enlarging thyroid nodule \par  patient agreed \par \par i recommended screening colonoscopy referral for colon cancer screening  , he declined \par \par GERD- cont with Nexium\par diet and weight loss recommended \par \par \par  skin lesions- AK- needs to followup with derm though his private insurance

## 2021-09-02 ENCOUNTER — FORM ENCOUNTER (OUTPATIENT)
Age: 61
End: 2021-09-02

## 2021-11-30 ENCOUNTER — APPOINTMENT (OUTPATIENT)
Dept: SURGERY | Facility: CLINIC | Age: 61
End: 2021-11-30
Payer: COMMERCIAL

## 2021-11-30 PROCEDURE — 99213 OFFICE O/P EST LOW 20 MIN: CPT

## 2021-11-30 RX ORDER — ROSUVASTATIN CALCIUM 10 MG/1
10 TABLET, FILM COATED ORAL
Qty: 90 | Refills: 0 | Status: ACTIVE | COMMUNITY
Start: 2021-10-25

## 2021-11-30 RX ORDER — LOSARTAN POTASSIUM 50 MG/1
50 TABLET, FILM COATED ORAL
Qty: 30 | Refills: 0 | Status: ACTIVE | COMMUNITY
Start: 2021-07-13

## 2021-11-30 NOTE — PHYSICAL EXAM
[de-identified] : no cervical or supraclavicular adenopathy, trachea midline, thyroid isthmus nodule 2.5 cm , left lobe full without  palpable discreet mass [Normal] : no neck adenopathy [de-identified] : Skin:  normal appearance.  no rash, nodules, vesicles, or erythema,\par Musculoskeletal:  full range of motion and no deformities appreciated\par Neurological:  grossly intact\par Psychiatric:  oriented to person, place and time with appropriate affect

## 2021-11-30 NOTE — ASSESSMENT
[FreeTextEntry1] : Patient with suspicious left thyroid nodule and increased risk due to exposure for malignancy. I have recommended a left thyroid lobectomy and isthmusectomy for definitive diagnosis and treatment. The patient is reluctant to undergo surgery. He understands the potential risk of undiagnosed malignancy. I have discussed the risks, benefits and alternative treatments which include but are not limited to bleeding, infection, numbness, hoarseness, hypocalcemia, scarring, and need for reoperation. I have answered the patient's questions. He would like to continue observation. Will obtain US 5//2022, RTO 6 mo if nodule continues to enlarge, repeat FNA left lower nodule at that time  I have answered their questions to the best of my ability.\par

## 2021-11-30 NOTE — HISTORY OF PRESENT ILLNESS
[de-identified] : Patient referred by Dr. Gomes for evaluation of suspicious left thyroid nodule. In 2003 patient noted neck swelling, diagnosed with thyroid nodules. Denies dysphagia, change in voice. Patient being monitored by [employer]. Thyroid ultrasound April 2017: Right lobe 4.4 x 1.5 x 2.3 CM with subcentimeter nodules. The left lobe 6.3 x 3.1 x 2.3 CM with multiple nodules largest 2.3 CM. Isthmus complex mass 2.4 x 1.4 x 2.1 CM. Biopsies June 2019: Isthmus nodule 2.5 cm benign, left mid nodule 2.1 cm with course calcifications   AUS, KRAS dictation Thyroseq, intermediate risk 40%. f/u US 10/2021 with stable nodules. Patient did not want surgery.    denies recent illness.  I have reviewed all old and new data and available images.

## 2022-05-26 ENCOUNTER — APPOINTMENT (OUTPATIENT)
Dept: SURGERY | Facility: CLINIC | Age: 62
End: 2022-05-26

## 2022-08-23 ENCOUNTER — APPOINTMENT (OUTPATIENT)
Dept: OTHER | Facility: CLINIC | Age: 62
End: 2022-08-23

## 2022-08-23 VITALS
WEIGHT: 220 LBS | BODY MASS INDEX: 29.8 KG/M2 | HEART RATE: 70 BPM | OXYGEN SATURATION: 99 % | DIASTOLIC BLOOD PRESSURE: 89 MMHG | RESPIRATION RATE: 18 BRPM | SYSTOLIC BLOOD PRESSURE: 142 MMHG | TEMPERATURE: 98.5 F | HEIGHT: 72 IN

## 2022-08-23 DIAGNOSIS — Z12.9 ENCOUNTER FOR SCREENING FOR MALIGNANT NEOPLASM, SITE UNSPECIFIED: ICD-10-CM

## 2022-08-23 PROCEDURE — 99212 OFFICE O/P EST SF 10 MIN: CPT | Mod: 25

## 2022-08-23 PROCEDURE — 99396 PREV VISIT EST AGE 40-64: CPT | Mod: 25

## 2022-08-23 RX ORDER — ESOMEPRAZOLE MAGNESIUM 20 MG/1
20 CAPSULE, DELAYED RELEASE ORAL
Qty: 60 | Refills: 11 | Status: ACTIVE | COMMUNITY
Start: 2020-12-03 | End: 1900-01-01

## 2022-08-23 NOTE — PAST MEDICAL HISTORY
[FreeTextEntry1] : patient is a  that was exposed to [facility] dust and debris after 09 11 2001\par \par

## 2022-08-23 NOTE — PAST MEDICAL HISTORY
[FreeTextEntry1] : patient is a  that was exposed to [facility] dust and debris after 09 11 2001\par \par

## 2022-08-23 NOTE — DISCUSSION/SUMMARY
[Patient seen for WTC Monitoring ___] : Patient was seen for WTC monitoring [unfilled] [Please See Note in Chart and Documentation in Trial DB] : Please see note in chart and documentation in Trial DB. [FreeTextEntry3] : HPI: 62 yr old white male presents to [facility] for annual monitoring visit. 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 HE was exposed to significant amounts of dust and debris.\par Occupational Hx: [employer] \par PMH:HTN, HLD and high triglycerides.\par PSH: See Allscripts and Trial DB \par Family History: See Allscripts and Trial DB \par Preventive Screening: \par Colonoscopy- never had \par CXR-1/1/19\par \par Allergies:  nkda \par Meds: REVIEWED IN   Trial DB and Allscripts \par Social Hx: See Allscripts and Trial DB\par Smoking Status: former smoker \par Review of Systems-IAMQ reviewed with patient\par \par PHYSICAL EXAM: in trial DB \par \par Spirometry:deferred \par \par Assessment and plan  :\par \par CBC, CMP, lipids, UA \par see Follow up OCC MED note

## 2022-08-23 NOTE — ASSESSMENT
[FreeTextEntry1] : \par  left thyroid nodule and increased risk due to exposure for malignancy.\par surgeon recommended  a left thyroid lobectomy and isthmusectomy for definitive diagnosis and treatment. The patient is reluctant to undergo surgery\par  He would like to continue observation. he did no obtain SONO  US 5//2022, i had advised he scheduled SONO and follow up with  head and neck surgeon \par \par  i had explained to the pt that follow up of benign thyroid nodules follow up is beyond the scope of [facility] HTP coverage \par  he understands he needs to follow up for sono and with Hand N surgeon though his private medical coverage \par \par i recommended screening colonoscopy referral for colon cancer screening  , he declined \par  will order stool DNA test \par \par GERD- cont with Nexium\par diet and weight loss recommended \par \par \par  skin lesions- AK- needs to followup with derm though his private insurance

## 2022-08-23 NOTE — ASSESSMENT
[FreeTextEntry1] : \par  left thyroid nodule and increased risk due to exposure for malignancy.\par surgeon recommended  a left thyroid lobectomy and isthmusectomy for definitive diagnosis and treatment. The patient is reluctant to undergo surgery\par  He would like to continue observation. he did no obtain SONO  US 5//2022, i had advised he scheduled SONO and follow up with  head and neck surgeon \par \par  i had explained to the pt that follow up of benign thyroid nodules follow up is beyond the scope of [facility] HTP coverage \par  he understands he needs to follow up for sono and with Hand N surgeon though his private medical coverage \par \par i recommended screening colonoscopy referral for colon cancer screening  , he declined \par  will order stool DNA test \par \par GERD- cont with Nexium\par diet and weight loss recommended \par \par \par  skin lesions- AK- needs to followup with derm though his private insurance

## 2022-08-23 NOTE — HISTORY OF PRESENT ILLNESS
[FreeTextEntry1] : HPI: 63  yr old white male presents to [facility] for follow up\par \par  In 2003 patient noted neck swelling, he was reportedly diagnosed with thyroid nodules. Denies dysphagia, change in voice.\par  Thyroid ultrasound April 2017: Right lobe 4.4 x 1.5 x 2.3 CM with subcentimeter nodules. The left lobe 6.3 x 3.1 x 2.3 CM with multiple nodules largest 2.3 CM. Isthmus complex mass 2.4 x 1.4 x 2.1 CM. \par \par Biopsies June 2019: Isthmus nodule 2.5 cm benign, left mid nodule 2.1 cm with course calcifications AUS, KRAS dictation Thyroseq, intermediate risk 40%. \par he was evaluated by head and neck surgeon last year 11 30 2021 \par 10 08 2021-  thyroid SONO \par the plan was \par    05/2022, RTO 6 mo if nodule continues to enlarge, repeat FNA left lower nodule at that time. 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 HE was exposed to significant amounts of dust and debris.\par Occupational Hx: [employer] \par \par  wife has breast cancer, completed treatment

## 2022-08-23 NOTE — DISCUSSION/SUMMARY
[Patient seen for WTC Monitoring ___] : Patient was seen for WTC monitoring [unfilled] [Please See Note in Chart and Documentation in Trial DB] : Please see note in chart and documentation in Trial DB. [FreeTextEntry3] : HPI: 62 yr old white male presents to [facility] for annual monitoring visit. 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 HE was exposed to significant amounts of dust and debris.\par Occupational Hx: [employer] \par PMH:HTN, HLD and high triglycerides.\par PSH: See Allscripts and Trial DB \par Family History: See Allscripts and Trial DB \par Preventive Screening: \par Colonoscopy- never had \par CXR-1/1/19\par \par Allergies:  nkda \par Meds: REVIEWED IN   Trial DB and Allscripts \par Social Hx: See Allscripts and Trial DB\par Smoking Status: former smoker \par Review of Systems-IAMQ reviewed with patient\par \par PHYSICAL EXAM: in trial DB \par \par Spirometry:deferred \par \par Assessment and plan  :\par \par CBC, CMP, lipids, UA \par see Follow up OCC MED note

## 2022-08-23 NOTE — HISTORY OF PRESENT ILLNESS
[FreeTextEntry1] : HPI: 63  yr old white male presents to [facility] for follow up\par \par  In 2003 patient noted neck swelling, he was reportedly diagnosed with thyroid nodules. Denies dysphagia, change in voice.\par  Thyroid ultrasound April 2017: Right lobe 4.4 x 1.5 x 2.3 CM with subcentimeter nodules. The left lobe 6.3 x 3.1 x 2.3 CM with multiple nodules largest 2.3 CM. Isthmus complex mass 2.4 x 1.4 x 2.1 CM. \par \par Biopsies June 2019: Isthmus nodule 2.5 cm benign, left mid nodule 2.1 cm with course calcifications AUS, KRAS dictation Thyroseq, intermediate risk 40%. \par he was evaluated by head and neck surgeon last year 11 30 2021 \par 10 08 2021-  thyroid SONO \par the plan was \par    05/2022, RTO 6 mo if nodule continues to enlarge, repeat FNA left lower nodule at that time. 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 HE was exposed to significant amounts of dust and debris.\par Occupational Hx: [employer] \par \par  wife has breast cancer, completed treatment

## 2022-08-25 LAB
ALBUMIN SERPL ELPH-MCNC: 4.5 G/DL
ALP BLD-CCNC: 66 U/L
ALT SERPL-CCNC: 14 U/L
ANION GAP SERPL CALC-SCNC: 11 MMOL/L
APPEARANCE: CLEAR
AST SERPL-CCNC: 11 U/L
BACTERIA: NEGATIVE
BASOPHILS # BLD AUTO: 0.05 K/UL
BASOPHILS NFR BLD AUTO: 0.8 %
BILIRUB SERPL-MCNC: 0.4 MG/DL
BILIRUBIN URINE: NEGATIVE
BLOOD URINE: NEGATIVE
BUN SERPL-MCNC: 13 MG/DL
CALCIUM SERPL-MCNC: 9.4 MG/DL
CHLORIDE SERPL-SCNC: 105 MMOL/L
CHOLEST SERPL-MCNC: 161 MG/DL
CO2 SERPL-SCNC: 24 MMOL/L
COLOR: COLORLESS
CREAT SERPL-MCNC: 0.95 MG/DL
EGFR: 92 ML/MIN/1.73M2
EOSINOPHIL # BLD AUTO: 0.2 K/UL
EOSINOPHIL NFR BLD AUTO: 3.1 %
GLUCOSE QUALITATIVE U: NEGATIVE
GLUCOSE SERPL-MCNC: 89 MG/DL
HCT VFR BLD CALC: 42 %
HDLC SERPL-MCNC: 36 MG/DL
HGB BLD-MCNC: 13.8 G/DL
HYALINE CASTS: 0 /LPF
IMM GRANULOCYTES NFR BLD AUTO: 0.3 %
KETONES URINE: NEGATIVE
LDLC SERPL CALC-MCNC: 91 MG/DL
LEUKOCYTE ESTERASE URINE: NEGATIVE
LYMPHOCYTES # BLD AUTO: 2.43 K/UL
LYMPHOCYTES NFR BLD AUTO: 37.4 %
MAN DIFF?: NORMAL
MCHC RBC-ENTMCNC: 29.4 PG
MCHC RBC-ENTMCNC: 32.9 GM/DL
MCV RBC AUTO: 89.4 FL
MICROSCOPIC-UA: NORMAL
MONOCYTES # BLD AUTO: 0.65 K/UL
MONOCYTES NFR BLD AUTO: 10 %
NEUTROPHILS # BLD AUTO: 3.15 K/UL
NEUTROPHILS NFR BLD AUTO: 48.4 %
NITRITE URINE: NEGATIVE
NONHDLC SERPL-MCNC: 124 MG/DL
PH URINE: 7
PLATELET # BLD AUTO: 207 K/UL
POTASSIUM SERPL-SCNC: 4.3 MMOL/L
PROT SERPL-MCNC: 6.5 G/DL
PROTEIN URINE: NEGATIVE
RBC # BLD: 4.7 M/UL
RBC # FLD: 12.9 %
RED BLOOD CELLS URINE: 0 /HPF
SODIUM SERPL-SCNC: 140 MMOL/L
SPECIFIC GRAVITY URINE: 1.01
SQUAMOUS EPITHELIAL CELLS: 0 /HPF
TRIGL SERPL-MCNC: 166 MG/DL
UROBILINOGEN URINE: NORMAL
WBC # FLD AUTO: 6.5 K/UL
WHITE BLOOD CELLS URINE: 0 /HPF

## 2022-09-02 DIAGNOSIS — E04.1 NONTOXIC SINGLE THYROID NODULE: ICD-10-CM

## 2022-09-06 ENCOUNTER — TRANSCRIPTION ENCOUNTER (OUTPATIENT)
Age: 62
End: 2022-09-06

## 2022-09-08 ENCOUNTER — APPOINTMENT (OUTPATIENT)
Dept: SURGERY | Facility: CLINIC | Age: 62
End: 2022-09-08

## 2022-09-08 DIAGNOSIS — D49.7 NEOPLASM OF UNSPECIFIED BEHAVIOR OF ENDOCRINE GLANDS AND OTHER PARTS OF NERVOUS SYSTEM: ICD-10-CM

## 2022-09-08 DIAGNOSIS — E04.2 NONTOXIC MULTINODULAR GOITER: ICD-10-CM

## 2022-09-08 PROCEDURE — 99214 OFFICE O/P EST MOD 30 MIN: CPT

## 2022-09-08 NOTE — ASSESSMENT
[FreeTextEntry1] : Patient with suspicious left thyroid nodule and increased risk due to exposure for malignancy. I have recommended a left thyroid lobectomy and isthmusectomy for definitive diagnosis and treatment. The patient is reluctant to undergo surgery. He understands the potential risk of undiagnosed malignancy. I have discussed the risks, benefits and alternative treatments which include but are not limited to bleeding, infection, numbness, hoarseness, hypocalcemia, scarring, and need for reoperation. I have answered the patient's questions.  Nodule increased on ultrasound August 2022, again recommend surgery.  Patient is resistant.  I have requested a repeat FNA which he will not do until after his son's wedding October 16.  Potential delay in diagnosis and treatment discussed, RTO 6 weeks.     I have answered their questions to the best of my ability.\par

## 2022-09-08 NOTE — PHYSICAL EXAM
[de-identified] : no cervical or supraclavicular adenopathy, trachea midline, thyroid isthmus nodule 2.5 cm , left lobe full without  palpable discreet mass [Normal] : no neck adenopathy [de-identified] : Skin:  normal appearance.  no rash, nodules, vesicles, or erythema,\par Musculoskeletal:  full range of motion and no deformities appreciated\par Neurological:  grossly intact\par Psychiatric:  oriented to person, place and time with appropriate affect

## 2022-09-08 NOTE — HISTORY OF PRESENT ILLNESS
[de-identified] : Patient referred by Dr. Gomes for evaluation of suspicious left thyroid nodule. In 2003 patient noted neck swelling, diagnosed with thyroid nodules. Denies dysphagia, change in voice. Patient being monitored by [employer]. Thyroid ultrasound April 2017: Right lobe 4.4 x 1.5 x 2.3 CM with subcentimeter nodules. The left lobe 6.3 x 3.1 x 2.3 CM with multiple nodules largest 2.3 CM. Isthmus complex mass 2.4 x 1.4 x 2.1 CM. Biopsies June 2019: Isthmus nodule 2.5 cm benign, left mid nodule 2.1 cm with course calcifications   AUS, KRAS dictation Thyroseq, intermediate risk 40%. f/u ultrasound August 2022 with increase in suspicious left nodule now 2.37 cm.  biopsy recommended.  patient did not want surgery.    denies recent illness.  I have reviewed all old and new data and available images.

## 2022-09-18 ENCOUNTER — FORM ENCOUNTER (OUTPATIENT)
Age: 62
End: 2022-09-18

## 2022-10-27 ENCOUNTER — APPOINTMENT (OUTPATIENT)
Dept: SURGERY | Facility: CLINIC | Age: 62
End: 2022-10-27

## 2022-10-27 ENCOUNTER — NON-APPOINTMENT (OUTPATIENT)
Age: 62
End: 2022-10-27

## 2022-11-22 LAB — NONINV COLON CA DNA+OCC BLD SCRN STL QL: NEGATIVE

## 2023-01-12 ENCOUNTER — APPOINTMENT (OUTPATIENT)
Dept: SURGERY | Facility: CLINIC | Age: 63
End: 2023-01-12

## 2023-02-14 ENCOUNTER — NON-APPOINTMENT (OUTPATIENT)
Age: 63
End: 2023-02-14

## 2023-02-14 NOTE — CONSULT LETTER
[Dear  ___] : Dear  [unfilled], [Consult Letter:] : I had the pleasure of evaluating your patient, [unfilled]. [( Thank you for referring [unfilled] for consultation for _____ )] : Thank you for referring [unfilled] for consultation for [unfilled] [Please see my note below.] : Please see my note below. [Consult Closing:] : Thank you very much for allowing me to participate in the care of this patient.  If you have any questions, please do not hesitate to contact me. [Sincerely,] : Sincerely, [FreeTextEntry2] : Dr. Lance Hwang (PCP/Ref) [FreeTextEntry3] : Cleveland Paula MD \par Attending Surgeon \par Division of Thoracic Surgery \par , Cardiovascular and Thoracic Surgery \par [facility]\par \par

## 2023-02-14 NOTE — HISTORY OF PRESENT ILLNESS
[FreeTextEntry1] : Mr. SARA EUCEDA, 62 year old male, ....smoker, w/ hx of thyroid Ca, HTN, HLD, ....., who found to have lung nodule incidentally on CT urogram on 02/22/2021. \par \par CT chest on 03/18/2021: \par -  A nodule is visualized within the right upper lobe anteriorly (series 3 image 27), measuring 4 mm. \par - A nodule visualized within the left upper lobe anteriorly (series 3 image 32), measuring 3 mm, which is groundglass in attenuation. \par - A subpleural nodule is visualized within the left upper lobe laterally (series 3 image 35), measuring 2 mm. \par - A nodule is visualized within the left lower lobe anteriorly (series 3 image 80), measuring 6 mm, with possible tubular component.\par - Mild degree of patchy bilateral tree-in-bud pulmonary opacities is visualized, predominantly within the upper lobes.  \par \par CT chest on 01/25/2023: (MSR)\par - There is mild patchy pulmonary scarring. Minimal reticulonodular changes are identified in the upper lobes, right greater than left, consistent with mild sequela of bronchiolitis. \par - A 7 mm in the anterior left lower lobe on series 3 image 86 is without significant change. \par \par Patient is here today for CT surgery consultation, referred by Dr. Lance Hwang (PCP).

## 2023-02-22 ENCOUNTER — NON-APPOINTMENT (OUTPATIENT)
Age: 63
End: 2023-02-22

## 2023-02-22 DIAGNOSIS — Z03.89 ENCOUNTER FOR OBSERVATION FOR OTHER SUSPECTED DISEASES AND CONDITIONS RULED OUT: ICD-10-CM

## 2023-02-27 ENCOUNTER — APPOINTMENT (OUTPATIENT)
Dept: THORACIC SURGERY | Facility: CLINIC | Age: 63
End: 2023-02-27

## 2023-03-01 ENCOUNTER — APPOINTMENT (OUTPATIENT)
Dept: CARDIOLOGY | Facility: CLINIC | Age: 63
End: 2023-03-01
Payer: COMMERCIAL

## 2023-03-01 ENCOUNTER — NON-APPOINTMENT (OUTPATIENT)
Age: 63
End: 2023-03-01

## 2023-03-01 VITALS
OXYGEN SATURATION: 98 % | DIASTOLIC BLOOD PRESSURE: 80 MMHG | SYSTOLIC BLOOD PRESSURE: 136 MMHG | TEMPERATURE: 97.8 F | HEIGHT: 72 IN | WEIGHT: 224 LBS | BODY MASS INDEX: 30.34 KG/M2 | HEART RATE: 76 BPM

## 2023-03-01 DIAGNOSIS — Z13.228 ENCOUNTER FOR SCREENING FOR OTHER METABOLIC DISORDERS: ICD-10-CM

## 2023-03-01 PROCEDURE — 99204 OFFICE O/P NEW MOD 45 MIN: CPT | Mod: 25

## 2023-03-01 PROCEDURE — 93000 ELECTROCARDIOGRAM COMPLETE: CPT

## 2023-03-01 RX ORDER — LOSARTAN POTASSIUM 100 MG/1
100 TABLET, FILM COATED ORAL
Refills: 0 | Status: DISCONTINUED | COMMUNITY
End: 2023-03-01

## 2023-03-01 RX ORDER — ASPIRIN 81 MG
81 TABLET, DELAYED RELEASE (ENTERIC COATED) ORAL
Refills: 0 | Status: DISCONTINUED | COMMUNITY
End: 2023-03-01

## 2023-03-01 NOTE — HISTORY OF PRESENT ILLNESS
[FreeTextEntry1] : This is a 62 year old male with a Pmhx of  thyroid Ca, HTN, HLD, lung nodule who presents to the office as new patient for cardaic eval \par recently seen cardio had echo ef 45% was told to start coreg 3,125mg BID however pt was hesitant due to resting heart rate in the 60s here for second opinion \par \par pt does report some JIMENEZ and fatigue \par Pt denies any CP, heart palpitations, dizziness, abdominal pain N/V/D fever or chills\par

## 2023-03-03 ENCOUNTER — APPOINTMENT (OUTPATIENT)
Dept: CARDIOLOGY | Facility: CLINIC | Age: 63
End: 2023-03-03
Payer: COMMERCIAL

## 2023-03-03 PROCEDURE — 93306 TTE W/DOPPLER COMPLETE: CPT

## 2023-03-16 ENCOUNTER — APPOINTMENT (OUTPATIENT)
Dept: PULMONOLOGY | Facility: CLINIC | Age: 63
End: 2023-03-16
Payer: COMMERCIAL

## 2023-03-16 VITALS
OXYGEN SATURATION: 97 % | WEIGHT: 228 LBS | BODY MASS INDEX: 30.88 KG/M2 | DIASTOLIC BLOOD PRESSURE: 80 MMHG | RESPIRATION RATE: 16 BRPM | HEART RATE: 71 BPM | HEIGHT: 72 IN | SYSTOLIC BLOOD PRESSURE: 136 MMHG | TEMPERATURE: 98.3 F

## 2023-03-16 PROCEDURE — 99204 OFFICE O/P NEW MOD 45 MIN: CPT

## 2023-03-19 NOTE — CONSULT LETTER
[Dear  ___] : Dear  [unfilled], [Consult Letter:] : I had the pleasure of evaluating your patient, [unfilled]. [Please see my note below.] : Please see my note below. [Sincerely,] : Sincerely, [Consult Closing:] : Thank you very much for allowing me to participate in the care of this patient.  If you have any questions, please do not hesitate to contact me. [FreeTextEntry3] : Michaela Suarez MD, [facility]

## 2023-03-19 NOTE — HISTORY OF PRESENT ILLNESS
[TextBox_4] : 62M reports worsening JIMENEZ over past few months. Usually pretty active. No asthma.\par Former smoker for about 10yrs 1/2 ppd.\par No cough, no phlegm, no wheezing izon occasionally at night. \par Tried inhalers when he had a cold, was on proventil. \par H/o HTN and high cholesterol.\par No cardiac history.\par

## 2023-03-19 NOTE — ASSESSMENT
[FreeTextEntry1] : Pulmonary nodule\par \par 7mm LLL pulm nodule\par solid nodule\par stable for at least 2 years\par light former smoker\par Per Fleischner society guidelines, would not require further surveillance\par \par JIMENEZ\par \par prior PFT with mild restrictive pattern\par mild biapical scarring\par will repeat PFT at this time\par cardiology f/u with h/o CHF\par

## 2023-04-04 ENCOUNTER — APPOINTMENT (OUTPATIENT)
Dept: CARDIOLOGY | Facility: CLINIC | Age: 63
End: 2023-04-04

## 2023-04-17 ENCOUNTER — APPOINTMENT (OUTPATIENT)
Dept: CARDIOLOGY | Facility: CLINIC | Age: 63
End: 2023-04-17
Payer: COMMERCIAL

## 2023-04-17 VITALS
SYSTOLIC BLOOD PRESSURE: 122 MMHG | DIASTOLIC BLOOD PRESSURE: 70 MMHG | HEIGHT: 72 IN | BODY MASS INDEX: 30.88 KG/M2 | HEART RATE: 60 BPM | WEIGHT: 228 LBS | TEMPERATURE: 97.9 F | OXYGEN SATURATION: 99 %

## 2023-04-17 DIAGNOSIS — R42 DIZZINESS AND GIDDINESS: ICD-10-CM

## 2023-04-17 DIAGNOSIS — E78.00 PURE HYPERCHOLESTEROLEMIA, UNSPECIFIED: ICD-10-CM

## 2023-04-17 DIAGNOSIS — R06.09 OTHER FORMS OF DYSPNEA: ICD-10-CM

## 2023-04-17 DIAGNOSIS — I50.20 UNSPECIFIED SYSTOLIC (CONGESTIVE) HEART FAILURE: ICD-10-CM

## 2023-04-17 DIAGNOSIS — E55.9 VITAMIN D DEFICIENCY, UNSPECIFIED: ICD-10-CM

## 2023-04-17 DIAGNOSIS — I10 ESSENTIAL (PRIMARY) HYPERTENSION: ICD-10-CM

## 2023-04-17 DIAGNOSIS — E04.1 NONTOXIC SINGLE THYROID NODULE: ICD-10-CM

## 2023-04-17 PROCEDURE — 99214 OFFICE O/P EST MOD 30 MIN: CPT

## 2023-04-17 NOTE — HISTORY OF PRESENT ILLNESS
[FreeTextEntry1] : This is a 63 y/o male with a pmhx of thyroid Ca, HTN, HLD, lung nodule here today for a follow up. Pt continues to report dyspnea on exertion. He does report intermittent dizziness. Patient denies chest pain, dyspnea, palpitations, syncope, changes in bowel/bladder habits or appetite. \par

## 2023-05-07 PROCEDURE — 93224 XTRNL ECG REC UP TO 48 HRS: CPT

## 2023-05-09 ENCOUNTER — APPOINTMENT (OUTPATIENT)
Dept: PULMONOLOGY | Facility: CLINIC | Age: 63
End: 2023-05-09
Payer: COMMERCIAL

## 2023-05-09 PROCEDURE — 94726 PLETHYSMOGRAPHY LUNG VOLUMES: CPT

## 2023-05-09 PROCEDURE — 94729 DIFFUSING CAPACITY: CPT

## 2023-05-09 PROCEDURE — 94010 BREATHING CAPACITY TEST: CPT

## 2023-07-24 ENCOUNTER — APPOINTMENT (OUTPATIENT)
Dept: OTHER | Facility: CLINIC | Age: 63
End: 2023-07-24
Payer: COMMERCIAL

## 2023-07-24 VITALS
BODY MASS INDEX: 30.48 KG/M2 | RESPIRATION RATE: 18 BRPM | OXYGEN SATURATION: 96 % | DIASTOLIC BLOOD PRESSURE: 81 MMHG | WEIGHT: 225 LBS | SYSTOLIC BLOOD PRESSURE: 127 MMHG | TEMPERATURE: 98.2 F | HEART RATE: 69 BPM | HEIGHT: 72 IN

## 2023-07-24 DIAGNOSIS — J31.0 CHRONIC RHINITIS: ICD-10-CM

## 2023-07-24 DIAGNOSIS — Z04.9 ENCOUNTER FOR EXAMINATION AND OBSERVATION FOR UNSPECIFIED REASON: ICD-10-CM

## 2023-07-24 DIAGNOSIS — R91.1 SOLITARY PULMONARY NODULE: ICD-10-CM

## 2023-07-24 DIAGNOSIS — K21.9 GASTRO-ESOPHAGEAL REFLUX DISEASE W/OUT ESOPHAGITIS: ICD-10-CM

## 2023-07-24 PROCEDURE — 99214 OFFICE O/P EST MOD 30 MIN: CPT | Mod: 25

## 2023-07-24 PROCEDURE — 99396 PREV VISIT EST AGE 40-64: CPT | Mod: 25

## 2023-07-24 RX ORDER — CARVEDILOL 3.12 MG/1
3.12 TABLET, FILM COATED ORAL
Qty: 60 | Refills: 0 | Status: ACTIVE | COMMUNITY
Start: 2023-02-01

## 2023-07-24 RX ORDER — POLYMYXIN B SULFATE AND TRIMETHOPRIM 10000; 1 [USP'U]/ML; MG/ML
10000-0.1 SOLUTION OPHTHALMIC
Qty: 10 | Refills: 0 | Status: ACTIVE | COMMUNITY
Start: 2023-06-22

## 2023-07-24 RX ORDER — MOMETASONE FUROATE 1 MG/G
0.1 CREAM TOPICAL
Qty: 45 | Refills: 0 | Status: ACTIVE | COMMUNITY
Start: 2023-01-31

## 2023-07-24 NOTE — DISCUSSION/SUMMARY
[Patient seen for WTC Monitoring ___] : Patient was seen for WTC monitoring [unfilled] [Please See Note in Chart and Documentation in Trial DB] : Please see note in chart and documentation in Trial DB. [FreeTextEntry3] : HPI: 62 yr old white male presents to [facility] for annual monitoring visit. \par \par \par  \par \par PCP:Dr Padilla ([place]) \par \par 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 HE was exposed to significant amounts of dust and debris.\par Occupational Hx: [employer] \par PMH:HTN, HLD and high triglycerides.\par PSH: See Allscripts and Trial DB \par Family History: See Allscripts and Trial DB \par Preventive Screening: \par Colonoscopy- never had \par CT chest on 03/18/2021: \par - A nodule is visualized within the right upper lobe anteriorly (series 3 image 27), measuring 4 mm. \par - A nodule visualized within the left upper lobe anteriorly (series 3 image 32), measuring 3 mm, which is groundglass in attenuation. \par - A subpleural nodule is visualized within the left upper lobe laterally (series 3 image 35), measuring 2 mm. \par - A nodule is visualized within the left lower lobe anteriorly (series 3 image 80), measuring 6 mm, with possible tubular component.\par - Mild degree of patchy bilateral tree-in-bud pulmonary opacities is visualized, predominantly within the upper lobes. \par \par CT chest on 01/25/2023: (MSR)\par - There is mild patchy pulmonary scarring. Minimal reticulonodular changes are identified in the upper lobes, right greater than left, consistent with mild sequela of bronchiolitis. \par - A 7 mm in the anterior left lower lobe on series 3 image 86 is without significant change.\par \par Allergies:  nkda \par Meds: REVIEWED IN   Trial DB and Allscripts \par Social Hx: See Allscripts and Trial DB\par Smoking Status: former smoker \par Review of Systems-IAMQ reviewed with patient\par \par PHYSICAL EXAM: in trial DB \par \par Spirometry:declined \par \par Assessment and plan  :\par \par CBC, CMP, lipids, UA \par see Follow up OCC MED note

## 2023-07-24 NOTE — HEALTH RISK ASSESSMENT
[Patient declined colonoscopy] : Patient declined colonoscopy [ColonoscopyComments] : had cologuard test 2022

## 2023-07-24 NOTE — DISCUSSION/SUMMARY
[Patient seen for WTC Monitoring ___] : Patient was seen for WTC monitoring [unfilled] [Please See Note in Chart and Documentation in Trial DB] : Please see note in chart and documentation in Trial DB. [FreeTextEntry3] : HPI: 62 yr old white male presents to [facility] for annual monitoring visit. \par \par \par  \par \par PCP:Dr Padilla ([place]) \par \par [facility] Exposure Hx: 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 HE was exposed to significant amounts of dust and debris.\par Occupational Hx: [employer] \par PMH:HTN, HLD and high triglycerides.\par PSH: See Allscripts and Trial DB \par Family History: See Allscripts and Trial DB \par Preventive Screening: \par Colonoscopy- never had \par CT chest on 03/18/2021: \par - A nodule is visualized within the right upper lobe anteriorly (series 3 image 27), measuring 4 mm. \par - A nodule visualized within the left upper lobe anteriorly (series 3 image 32), measuring 3 mm, which is groundglass in attenuation. \par - A subpleural nodule is visualized within the left upper lobe laterally (series 3 image 35), measuring 2 mm. \par - A nodule is visualized within the left lower lobe anteriorly (series 3 image 80), measuring 6 mm, with possible tubular component.\par - Mild degree of patchy bilateral tree-in-bud pulmonary opacities is visualized, predominantly within the upper lobes. \par \par CT chest on 01/25/2023: (MSR)\par - There is mild patchy pulmonary scarring. Minimal reticulonodular changes are identified in the upper lobes, right greater than left, consistent with mild sequela of bronchiolitis. \par - A 7 mm in the anterior left lower lobe on series 3 image 86 is without significant change.\par \par Allergies:  nkda \par Meds: REVIEWED IN   Trial DB and Allscripts \par Social Hx: See Allscripts and Trial DB\par Smoking Status: former smoker \par Review of Systems-IAMQ reviewed with patient\par \par PHYSICAL EXAM: in trial DB \par \par Spirometry:declined \par \par Assessment and plan  :\par \par CBC, CMP, lipids, UA \par see Follow up OCC MED note

## 2023-07-26 DIAGNOSIS — Z12.9 ENCOUNTER FOR SCREENING FOR MALIGNANT NEOPLASM, SITE UNSPECIFIED: ICD-10-CM

## 2023-07-26 LAB
ALBUMIN SERPL ELPH-MCNC: 4.3 G/DL
ALP BLD-CCNC: 65 U/L
ALT SERPL-CCNC: 15 U/L
ANION GAP SERPL CALC-SCNC: 11 MMOL/L
APPEARANCE: CLEAR
AST SERPL-CCNC: 13 U/L
BACTERIA: NEGATIVE /HPF
BILIRUB SERPL-MCNC: 0.2 MG/DL
BILIRUBIN URINE: NEGATIVE
BLOOD URINE: ABNORMAL
BUN SERPL-MCNC: 17 MG/DL
CALCIUM SERPL-MCNC: 9 MG/DL
CAST: 0 /LPF
CHLORIDE SERPL-SCNC: 105 MMOL/L
CHOLEST SERPL-MCNC: 151 MG/DL
CO2 SERPL-SCNC: 25 MMOL/L
COLOR: YELLOW
CREAT SERPL-MCNC: 0.89 MG/DL
EGFR: 98 ML/MIN/1.73M2
EPITHELIAL CELLS: 1 /HPF
GLUCOSE QUALITATIVE U: NEGATIVE MG/DL
GLUCOSE SERPL-MCNC: 81 MG/DL
HDLC SERPL-MCNC: 36 MG/DL
KETONES URINE: NEGATIVE MG/DL
LDLC SERPL CALC-MCNC: 90 MG/DL
LEUKOCYTE ESTERASE URINE: ABNORMAL
MICROSCOPIC-UA: NORMAL
NITRITE URINE: NEGATIVE
NONHDLC SERPL-MCNC: 115 MG/DL
PH URINE: 6.5
POTASSIUM SERPL-SCNC: 4.2 MMOL/L
PROT SERPL-MCNC: 6.3 G/DL
PROTEIN URINE: NEGATIVE MG/DL
RED BLOOD CELLS URINE: 3 /HPF
SODIUM SERPL-SCNC: 140 MMOL/L
SPECIFIC GRAVITY URINE: 1.02
TRIGL SERPL-MCNC: 143 MG/DL
UROBILINOGEN URINE: 0.2 MG/DL
WHITE BLOOD CELLS URINE: 0 /HPF

## 2023-07-26 NOTE — HISTORY OF PRESENT ILLNESS
[FreeTextEntry1] : HPI: 64  yr old white male presents to [facility] for follow up\par \par  In 2003 patient noted neck swelling, he was reportedly diagnosed with thyroid nodules. Denies dysphagia, change in voice.\par  Thyroid ultrasound April 2017: Right lobe 4.4 x 1.5 x 2.3 CM with subcentimeter nodules. The left lobe 6.3 x 3.1 x 2.3 CM with multiple nodules largest 2.3 CM. Isthmus complex mass 2.4 x 1.4 x 2.1 CM. \par \par Biopsies June 2019: Isthmus nodule 2.5 cm benign, left mid nodule 2.1 cm with course calcifications AUS, KRAS dictation Thyroseq, intermediate risk 40%. \par he was evaluated by head and neck surgeon last year \par  ultrasound August 2022 with increase in suspicious left nodule now 2.37 cm. biopsy recommended and scheduled as  patient did not want surgery. \par  but patent ultimately decided against biopsy as well \par he sis not follow up with ENT\par \par \par CT chest on 03/18/2021: \par - A nodule is visualized within the right upper lobe anteriorly (series 3 image 27), measuring 4 mm. \par - A nodule visualized within the left upper lobe anteriorly (series 3 image 32), measuring 3 mm, which is groundglass in attenuation. \par - A subpleural nodule is visualized within the left upper lobe laterally (series 3 image 35), measuring 2 mm. \par - A nodule is visualized within the left lower lobe anteriorly (series 3 image 80), measuring 6 mm, with possible tubular component.\par - Mild degree of patchy bilateral tree-in-bud pulmonary opacities is visualized, predominantly within the upper lobes. \par \par CT chest on 01/25/2023: (MSR)\par - There is mild patchy pulmonary scarring. Minimal reticulonodular changes are identified in the upper lobes, right greater than left, consistent with mild sequela of bronchiolitis. 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 HE was exposed to significant amounts of dust and debris.\par Occupational Hx: [employer] \par \par  wife has breast cancer, completed treatment

## 2023-07-26 NOTE — ASSESSMENT
[FreeTextEntry1] : \par  left thyroid nodule with  increased risk due to exposure for malignancy.\par surgeon recommended  a left thyroid lobectomy and isthmusectomy for definitive diagnosis and treatment. The patient declined  undergo surgery\par  thyroid nodule biopsy was schedule but he decided against it \par \par  He would like to continue observation. i referred him to thyroid sonogram that he will do using his private insurance\par  i had explained to the pt that follow up of benign thyroid nodules [facility] \par  he understands he needs to follow up for thyroid  sono and with Endocrinologist \par  He understands the potential risk of undiagnosed malignancy since he is refusing thyroid biopsy for definitive diagnosis \par i had reviewed with him ENT recommendations at least to have thyroid biopsy but he still declined \par  i had recommended he schedule a follow up with ENdocrinologist and gave him Dr Paula number \par GERD- cont with Nexium\par diet and weight loss recommended \par \par \par lung nodule- stable for 2 years [id number] \par  PULM note reviewed \par  no new imaging warranted

## 2023-07-26 NOTE — PAST MEDICAL HISTORY
[FreeTextEntry1] : patient is a  that was exposed to [facility] dust and debris after 09 11 2001\par \par

## 2023-07-26 NOTE — PAST MEDICAL HISTORY
[FreeTextEntry1] : patient is a  that was exposed to [facility] dust and debris after 09 11 2001\par \par

## 2023-07-26 NOTE — PHYSICAL EXAM
[General Appearance - Alert] : alert [General Appearance - In No Acute Distress] : in no acute distress [Auscultation Breath Sounds / Voice Sounds] : lungs were clear to auscultation bilaterally [Heart Rate And Rhythm] : heart rate was normal and rhythm regular [Heart Sounds] : normal S1 and S2 [Heart Sounds Gallop] : no gallops [Murmurs] : no murmurs [Heart Sounds Pericardial Friction Rub] : no pericardial rub [Bowel Sounds] : normal bowel sounds [Abdomen Soft] : soft [Abdomen Tenderness] : non-tender [] : no hepato-splenomegaly [Abdomen Mass (___ Cm)] : no abdominal mass palpated [FreeTextEntry1] : thyroid isthmus nodule 2.5 cm , left lobe full without palpable discreet mass. \par thyroid isthmus nodule 2.5 cm , left lobe full without palpable discreet mass. \par

## 2023-07-26 NOTE — ASSESSMENT
[FreeTextEntry1] : \par  left thyroid nodule with  increased risk due to exposure for malignancy.\par surgeon recommended  a left thyroid lobectomy and isthmusectomy for definitive diagnosis and treatment. The patient declined  undergo surgery\par  thyroid nodule biopsy was schedule but he decided against it \par \par  He would like to continue observation. i referred him to thyroid sonogram that he will do using his private insurance\par  i had explained to the pt that follow up of benign thyroid nodules [facility] \par  he understands he needs to follow up for thyroid  sono and with Endocrinologist \par  He understands the potential risk of undiagnosed malignancy since he is refusing thyroid biopsy for definitive diagnosis \par i had reviewed with him ENT recommendations at least to have thyroid biopsy but he still declined \par  i had recommended he schedule a follow up with ENdocrinologist and gave him Dr Paula number \par GERD- cont with Nexium\par diet and weight loss recommended \par \par \par lung nodule- stable for 2 years [id number] \par  PULM note reviewed \par  no new imaging warranted

## 2023-07-26 NOTE — HISTORY OF PRESENT ILLNESS
[FreeTextEntry1] : HPI: 64  yr old white male presents to [facility] for follow up\par \par  In 2003 patient noted neck swelling, he was reportedly diagnosed with thyroid nodules. Denies dysphagia, change in voice.\par  Thyroid ultrasound April 2017: Right lobe 4.4 x 1.5 x 2.3 CM with subcentimeter nodules. The left lobe 6.3 x 3.1 x 2.3 CM with multiple nodules largest 2.3 CM. Isthmus complex mass 2.4 x 1.4 x 2.1 CM. \par \par Biopsies June 2019: Isthmus nodule 2.5 cm benign, left mid nodule 2.1 cm with course calcifications AUS, KRAS dictation Thyroseq, intermediate risk 40%. \par he was evaluated by head and neck surgeon last year \par  ultrasound August 2022 with increase in suspicious left nodule now 2.37 cm. biopsy recommended and scheduled as  patient did not want surgery. \par  but patent ultimately decided against biopsy as well \par he sis not follow up with ENT\par \par \par CT chest on 03/18/2021: \par - A nodule is visualized within the right upper lobe anteriorly (series 3 image 27), measuring 4 mm. \par - A nodule visualized within the left upper lobe anteriorly (series 3 image 32), measuring 3 mm, which is groundglass in attenuation. \par - A subpleural nodule is visualized within the left upper lobe laterally (series 3 image 35), measuring 2 mm. \par - A nodule is visualized within the left lower lobe anteriorly (series 3 image 80), measuring 6 mm, with possible tubular component.\par - Mild degree of patchy bilateral tree-in-bud pulmonary opacities is visualized, predominantly within the upper lobes. \par \par CT chest on 01/25/2023: (MSR)\par - There is mild patchy pulmonary scarring. Minimal reticulonodular changes are identified in the upper lobes, right greater than left, consistent with mild sequela of bronchiolitis. 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 HE was exposed to significant amounts of dust and debris.\par Occupational Hx: [employer] \par \par  wife has breast cancer, completed treatment

## 2024-01-03 ENCOUNTER — APPOINTMENT (OUTPATIENT)
Dept: ENDOCRINOLOGY | Facility: CLINIC | Age: 64
End: 2024-01-03

## 2024-03-11 LAB
ALBUMIN SERPL ELPH-MCNC: 4.5 G/DL
ALP BLD-CCNC: 63 U/L
ALT SERPL-CCNC: 22 U/L
ANION GAP SERPL CALC-SCNC: 11 MMOL/L
APPEARANCE: CLEAR
AST SERPL-CCNC: 20 U/L
BACTERIA: NEGATIVE
BASOPHILS # BLD AUTO: 0.06 K/UL
BASOPHILS NFR BLD AUTO: 0.8 %
BILIRUB DIRECT SERPL-MCNC: 0.1 MG/DL
BILIRUB INDIRECT SERPL-MCNC: 0.2 MG/DL
BILIRUB SERPL-MCNC: 0.3 MG/DL
BILIRUBIN URINE: NEGATIVE
BLOOD URINE: ABNORMAL
BUN SERPL-MCNC: 12 MG/DL
CALCIUM SERPL-MCNC: 9.5 MG/DL
CHLORIDE SERPL-SCNC: 102 MMOL/L
CHOLEST SERPL-MCNC: 169 MG/DL
CK SERPL-CCNC: 58 U/L
CO2 SERPL-SCNC: 25 MMOL/L
COLOR: NORMAL
CREAT SERPL-MCNC: 0.87 MG/DL
EGFR: 99 ML/MIN/1.73M2
EOSINOPHIL # BLD AUTO: 0.27 K/UL
EOSINOPHIL NFR BLD AUTO: 3.4 %
ESTIMATED AVERAGE GLUCOSE: 120 MG/DL
GLUCOSE QUALITATIVE U: NEGATIVE
GLUCOSE SERPL-MCNC: 86 MG/DL
HBA1C MFR BLD HPLC: 5.8 %
HCT VFR BLD CALC: 44.6 %
HDLC SERPL-MCNC: 37 MG/DL
HGB BLD-MCNC: 14.4 G/DL
HYALINE CASTS: 0 /LPF
IMM GRANULOCYTES NFR BLD AUTO: 0.1 %
KETONES URINE: NEGATIVE
LDLC SERPL CALC-MCNC: 93 MG/DL
LEUKOCYTE ESTERASE URINE: NEGATIVE
LYMPHOCYTES # BLD AUTO: 2.86 K/UL
LYMPHOCYTES NFR BLD AUTO: 35.8 %
MAGNESIUM SERPL-MCNC: 2.3 MG/DL
MAN DIFF?: NORMAL
MCHC RBC-ENTMCNC: 29.4 PG
MCHC RBC-ENTMCNC: 32.3 GM/DL
MCV RBC AUTO: 91 FL
MICROSCOPIC-UA: NORMAL
MONOCYTES # BLD AUTO: 0.81 K/UL
MONOCYTES NFR BLD AUTO: 10.1 %
NEUTROPHILS # BLD AUTO: 3.98 K/UL
NEUTROPHILS NFR BLD AUTO: 49.8 %
NITRITE URINE: NEGATIVE
NONHDLC SERPL-MCNC: 132 MG/DL
NT-PROBNP SERPL-MCNC: 14 PG/ML
PH URINE: 7
PLATELET # BLD AUTO: 217 K/UL
POTASSIUM SERPL-SCNC: 4.3 MMOL/L
PROT SERPL-MCNC: 7.1 G/DL
PROTEIN URINE: NEGATIVE
RBC # BLD: 4.9 M/UL
RBC # FLD: 12.7 %
RED BLOOD CELLS URINE: 0 /HPF
SODIUM SERPL-SCNC: 139 MMOL/L
SPECIFIC GRAVITY URINE: 1.01
SQUAMOUS EPITHELIAL CELLS: 0 /HPF
T4 FREE SERPL-MCNC: 1.1 NG/DL
TRIGL SERPL-MCNC: 191 MG/DL
TSH SERPL-ACNC: 0.86 UIU/ML
UROBILINOGEN URINE: NORMAL
WBC # FLD AUTO: 7.99 K/UL
WHITE BLOOD CELLS URINE: 0 /HPF

## 2024-07-18 NOTE — ED PROVIDER NOTE - CPE EDP EYES NORM
Behavioral Health IP Nursing Progress Note    Suicidal Ideation:  Patient denies SI.     Current C-SSRS score: Negative Screen - White (07/18/24 0800)      Protective Factors / Reason for Living: Social supports    Interventions:   Negative screen, monitor Q15 minute checks  1:1 assessment    Subjective: Patient reported negative SI, HI, A/V/H, depression, anxiety and pain. Patient reports good sleep and mood.    Objective:   Mental Health: Patient behavior observed to be cooperative with medications and assessment. Patient was assessed while resting in bed, has flat affect, makes minimal eye contact.      Medical:   None this shift     Assessment / Actions:   PRN Medications given?   No    Plan:   Treatment Plan reviewed.         normal...

## 2025-01-07 ENCOUNTER — APPOINTMENT (OUTPATIENT)
Dept: CARDIOLOGY | Facility: CLINIC | Age: 65
End: 2025-01-07

## 2025-01-08 ENCOUNTER — APPOINTMENT (OUTPATIENT)
Dept: CARDIOLOGY | Facility: CLINIC | Age: 65
End: 2025-01-08
Payer: COMMERCIAL

## 2025-01-08 VITALS
SYSTOLIC BLOOD PRESSURE: 129 MMHG | WEIGHT: 225 LBS | HEIGHT: 72 IN | BODY MASS INDEX: 30.48 KG/M2 | DIASTOLIC BLOOD PRESSURE: 90 MMHG | TEMPERATURE: 98.7 F | HEART RATE: 67 BPM | OXYGEN SATURATION: 98 %

## 2025-01-08 DIAGNOSIS — R06.09 OTHER FORMS OF DYSPNEA: ICD-10-CM

## 2025-01-08 DIAGNOSIS — R42 DIZZINESS AND GIDDINESS: ICD-10-CM

## 2025-01-08 DIAGNOSIS — N40.0 BENIGN PROSTATIC HYPERPLASIA WITHOUT LOWER URINARY TRACT SYMPMS: ICD-10-CM

## 2025-01-08 DIAGNOSIS — I50.20 UNSPECIFIED SYSTOLIC (CONGESTIVE) HEART FAILURE: ICD-10-CM

## 2025-01-08 DIAGNOSIS — R07.89 OTHER CHEST PAIN: ICD-10-CM

## 2025-01-08 DIAGNOSIS — E78.00 PURE HYPERCHOLESTEROLEMIA, UNSPECIFIED: ICD-10-CM

## 2025-01-08 DIAGNOSIS — I77.810 THORACIC AORTIC ECTASIA: ICD-10-CM

## 2025-01-08 DIAGNOSIS — N28.1 CYST OF KIDNEY, ACQUIRED: ICD-10-CM

## 2025-01-08 PROCEDURE — 93000 ELECTROCARDIOGRAM COMPLETE: CPT

## 2025-01-08 PROCEDURE — 93306 TTE W/DOPPLER COMPLETE: CPT

## 2025-01-08 PROCEDURE — 99214 OFFICE O/P EST MOD 30 MIN: CPT

## 2025-01-08 PROCEDURE — G2211 COMPLEX E/M VISIT ADD ON: CPT | Mod: NC

## 2025-01-08 RX ORDER — UBIDECARENONE 200 MG
200 TABLET ORAL
Qty: 90 | Refills: 1 | Status: ACTIVE | COMMUNITY
Start: 2025-01-08 | End: 1900-01-01

## 2025-01-08 RX ORDER — ATORVASTATIN CALCIUM 10 MG/1
10 TABLET, FILM COATED ORAL DAILY
Qty: 90 | Refills: 3 | Status: ACTIVE | COMMUNITY
Start: 2025-01-08 | End: 1900-01-01

## 2025-01-08 RX ORDER — AMLODIPINE BESYLATE 5 MG/1
5 TABLET ORAL
Qty: 90 | Refills: 1 | Status: ACTIVE | COMMUNITY
Start: 2025-01-08 | End: 1900-01-01

## 2025-01-10 LAB
ALDOSTERONE SERUM: 9.9 NG/DL
APPEARANCE: CLEAR
BACTERIA: NEGATIVE /HPF
BILIRUBIN URINE: NEGATIVE
BLOOD URINE: ABNORMAL
CAST: 0 /LPF
COLOR: YELLOW
EPITHELIAL CELLS: 0 /HPF
GLUCOSE QUALITATIVE U: NEGATIVE MG/DL
KETONES URINE: NEGATIVE MG/DL
LEUKOCYTE ESTERASE URINE: NEGATIVE
MICROSCOPIC-UA: NORMAL
NITRITE URINE: NEGATIVE
PH URINE: 5.5
PROTEIN URINE: NEGATIVE MG/DL
RED BLOOD CELLS URINE: 4 /HPF
RENIN PLASMA: 25.6 PG/ML
SPECIFIC GRAVITY URINE: 1.02
UROBILINOGEN URINE: 0.2 MG/DL
WHITE BLOOD CELLS URINE: 0 /HPF

## 2025-01-13 ENCOUNTER — APPOINTMENT (OUTPATIENT)
Dept: OTHER | Facility: CLINIC | Age: 65
End: 2025-01-13
Payer: COMMERCIAL

## 2025-01-13 VITALS
DIASTOLIC BLOOD PRESSURE: 81 MMHG | RESPIRATION RATE: 15 BRPM | OXYGEN SATURATION: 99 % | BODY MASS INDEX: 30.07 KG/M2 | HEART RATE: 68 BPM | TEMPERATURE: 97.9 F | SYSTOLIC BLOOD PRESSURE: 138 MMHG | HEIGHT: 72 IN | WEIGHT: 222 LBS

## 2025-01-13 DIAGNOSIS — J32.9 CHRONIC SINUSITIS, UNSPECIFIED: ICD-10-CM

## 2025-01-13 DIAGNOSIS — Z04.9 ENCOUNTER FOR EXAMINATION AND OBSERVATION FOR UNSPECIFIED REASON: ICD-10-CM

## 2025-01-13 LAB
METANEPHRINE, PL: <25 PG/ML
NORMETANEPHRINE, PL: 185.2 PG/ML

## 2025-01-13 PROCEDURE — 99214 OFFICE O/P EST MOD 30 MIN: CPT | Mod: 25

## 2025-01-13 PROCEDURE — 94010 BREATHING CAPACITY TEST: CPT

## 2025-01-13 PROCEDURE — 99396 PREV VISIT EST AGE 40-64: CPT | Mod: 25

## 2025-01-13 RX ORDER — FLUTICASONE PROPIONATE 50 UG/1
50 SPRAY, METERED NASAL TWICE DAILY
Qty: 1 | Refills: 5 | Status: ACTIVE | COMMUNITY
Start: 2025-01-13 | End: 1900-01-01

## 2025-01-13 RX ORDER — OMEPRAZOLE 40 MG/1
40 CAPSULE, DELAYED RELEASE ORAL
Qty: 30 | Refills: 5 | Status: ACTIVE | COMMUNITY
Start: 2025-01-13 | End: 1900-01-01

## 2025-01-15 ENCOUNTER — APPOINTMENT (OUTPATIENT)
Dept: ULTRASOUND IMAGING | Facility: IMAGING CENTER | Age: 65
End: 2025-01-15

## 2025-01-15 ENCOUNTER — OUTPATIENT (OUTPATIENT)
Dept: OUTPATIENT SERVICES | Facility: HOSPITAL | Age: 65
LOS: 1 days | End: 2025-01-15
Payer: COMMERCIAL

## 2025-01-15 ENCOUNTER — APPOINTMENT (OUTPATIENT)
Dept: INTERNAL MEDICINE | Facility: CLINIC | Age: 65
End: 2025-01-15
Payer: COMMERCIAL

## 2025-01-15 VITALS
OXYGEN SATURATION: 99 % | SYSTOLIC BLOOD PRESSURE: 120 MMHG | HEIGHT: 72 IN | WEIGHT: 226 LBS | TEMPERATURE: 97.7 F | BODY MASS INDEX: 30.61 KG/M2 | DIASTOLIC BLOOD PRESSURE: 84 MMHG | HEART RATE: 66 BPM

## 2025-01-15 DIAGNOSIS — Z13.228 ENCOUNTER FOR SCREENING FOR OTHER METABOLIC DISORDERS: ICD-10-CM

## 2025-01-15 DIAGNOSIS — R13.10 DYSPHAGIA, UNSPECIFIED: ICD-10-CM

## 2025-01-15 DIAGNOSIS — I10 ESSENTIAL (PRIMARY) HYPERTENSION: ICD-10-CM

## 2025-01-15 DIAGNOSIS — E04.1 NONTOXIC SINGLE THYROID NODULE: ICD-10-CM

## 2025-01-15 DIAGNOSIS — R91.1 SOLITARY PULMONARY NODULE: ICD-10-CM

## 2025-01-15 DIAGNOSIS — K21.9 GASTRO-ESOPHAGEAL REFLUX DISEASE W/OUT ESOPHAGITIS: ICD-10-CM

## 2025-01-15 DIAGNOSIS — R22.1 LOCALIZED SWELLING, MASS AND LUMP, NECK: ICD-10-CM

## 2025-01-15 PROCEDURE — 76536 US EXAM OF HEAD AND NECK: CPT

## 2025-01-15 PROCEDURE — 99204 OFFICE O/P NEW MOD 45 MIN: CPT

## 2025-01-15 PROCEDURE — 76536 US EXAM OF HEAD AND NECK: CPT | Mod: 26

## 2025-01-15 PROCEDURE — G2211 COMPLEX E/M VISIT ADD ON: CPT | Mod: NC

## 2025-01-17 ENCOUNTER — NON-APPOINTMENT (OUTPATIENT)
Age: 65
End: 2025-01-17

## 2025-01-17 LAB
ALBUMIN SERPL ELPH-MCNC: 4.6 G/DL
ALP BLD-CCNC: 84 U/L
ALT SERPL-CCNC: 19 U/L
ANION GAP SERPL CALC-SCNC: 13 MMOL/L
AST SERPL-CCNC: 17 U/L
BASOPHILS # BLD AUTO: 0.06 K/UL
BASOPHILS NFR BLD AUTO: 0.7 %
BILIRUB SERPL-MCNC: 0.4 MG/DL
BUN SERPL-MCNC: 15 MG/DL
CALCIUM SERPL-MCNC: 9.4 MG/DL
CHLORIDE SERPL-SCNC: 101 MMOL/L
CO2 SERPL-SCNC: 26 MMOL/L
CREAT SERPL-MCNC: 0.92 MG/DL
EGFR: 94 ML/MIN/1.73M2
EOSINOPHIL # BLD AUTO: 0.4 K/UL
EOSINOPHIL NFR BLD AUTO: 4.6 %
GLUCOSE SERPL-MCNC: 78 MG/DL
HCT VFR BLD CALC: 44 %
HGB BLD-MCNC: 14.1 G/DL
IMM GRANULOCYTES NFR BLD AUTO: 0.2 %
INFLUENZA A RESULT: NOT DETECTED
INFLUENZA B RESULT: NOT DETECTED
LYMPHOCYTES # BLD AUTO: 3.59 K/UL
LYMPHOCYTES NFR BLD AUTO: 41.1 %
MAN DIFF?: NORMAL
MCHC RBC-ENTMCNC: 29.3 PG
MCHC RBC-ENTMCNC: 32 G/DL
MCV RBC AUTO: 91.5 FL
MONOCYTES # BLD AUTO: 0.79 K/UL
MONOCYTES NFR BLD AUTO: 9 %
NEUTROPHILS # BLD AUTO: 3.88 K/UL
NEUTROPHILS NFR BLD AUTO: 44.4 %
PLATELET # BLD AUTO: 233 K/UL
POTASSIUM SERPL-SCNC: 4.2 MMOL/L
PROT SERPL-MCNC: 7.3 G/DL
RBC # BLD: 4.81 M/UL
RBC # FLD: 13.2 %
RESP SYN VIRUS RESULT: NOT DETECTED
SARS-COV-2 RESULT: NOT DETECTED
SODIUM SERPL-SCNC: 140 MMOL/L
T4 FREE SERPL-MCNC: 1 NG/DL
TSH SERPL-ACNC: 0.71 UIU/ML
WBC # FLD AUTO: 8.74 K/UL

## 2025-01-21 ENCOUNTER — NON-APPOINTMENT (OUTPATIENT)
Age: 65
End: 2025-01-21

## 2025-01-30 ENCOUNTER — APPOINTMENT (OUTPATIENT)
Dept: CARDIOLOGY | Facility: CLINIC | Age: 65
End: 2025-01-30
Payer: COMMERCIAL

## 2025-01-30 VITALS
WEIGHT: 227 LBS | BODY MASS INDEX: 30.75 KG/M2 | DIASTOLIC BLOOD PRESSURE: 80 MMHG | OXYGEN SATURATION: 98 % | TEMPERATURE: 97.6 F | SYSTOLIC BLOOD PRESSURE: 120 MMHG | HEART RATE: 64 BPM | HEIGHT: 72 IN

## 2025-01-30 DIAGNOSIS — I50.20 UNSPECIFIED SYSTOLIC (CONGESTIVE) HEART FAILURE: ICD-10-CM

## 2025-01-30 DIAGNOSIS — R22.1 LOCALIZED SWELLING, MASS AND LUMP, NECK: ICD-10-CM

## 2025-01-30 DIAGNOSIS — E78.00 PURE HYPERCHOLESTEROLEMIA, UNSPECIFIED: ICD-10-CM

## 2025-01-30 DIAGNOSIS — N40.0 BENIGN PROSTATIC HYPERPLASIA WITHOUT LOWER URINARY TRACT SYMPMS: ICD-10-CM

## 2025-01-30 DIAGNOSIS — R42 DIZZINESS AND GIDDINESS: ICD-10-CM

## 2025-01-30 DIAGNOSIS — R91.1 SOLITARY PULMONARY NODULE: ICD-10-CM

## 2025-01-30 DIAGNOSIS — I10 ESSENTIAL (PRIMARY) HYPERTENSION: ICD-10-CM

## 2025-01-30 DIAGNOSIS — N28.1 CYST OF KIDNEY, ACQUIRED: ICD-10-CM

## 2025-01-30 DIAGNOSIS — I77.810 THORACIC AORTIC ECTASIA: ICD-10-CM

## 2025-01-30 DIAGNOSIS — R06.09 OTHER FORMS OF DYSPNEA: ICD-10-CM

## 2025-01-30 DIAGNOSIS — E04.1 NONTOXIC SINGLE THYROID NODULE: ICD-10-CM

## 2025-01-30 PROCEDURE — G2211 COMPLEX E/M VISIT ADD ON: CPT | Mod: NC

## 2025-01-30 PROCEDURE — 99214 OFFICE O/P EST MOD 30 MIN: CPT

## 2025-02-01 LAB
ALBUMIN SERPL ELPH-MCNC: 4.7 G/DL
ALP BLD-CCNC: 81 U/L
ALT SERPL-CCNC: 21 U/L
AST SERPL-CCNC: 16 U/L
BILIRUB DIRECT SERPL-MCNC: 0.1 MG/DL
BILIRUB INDIRECT SERPL-MCNC: 0.3 MG/DL
BILIRUB SERPL-MCNC: 0.4 MG/DL
CHOLEST SERPL-MCNC: 206 MG/DL
CK SERPL-CCNC: 52 U/L
HDLC SERPL-MCNC: 41 MG/DL
LDLC SERPL CALC-MCNC: 136 MG/DL
NONHDLC SERPL-MCNC: 165 MG/DL
PROT SERPL-MCNC: 6.7 G/DL
TRIGL SERPL-MCNC: 161 MG/DL

## 2025-04-04 ENCOUNTER — APPOINTMENT (OUTPATIENT)
Dept: CARDIOLOGY | Facility: CLINIC | Age: 65
End: 2025-04-04